# Patient Record
Sex: MALE | Race: WHITE | NOT HISPANIC OR LATINO | Employment: OTHER | URBAN - METROPOLITAN AREA
[De-identification: names, ages, dates, MRNs, and addresses within clinical notes are randomized per-mention and may not be internally consistent; named-entity substitution may affect disease eponyms.]

---

## 2018-07-15 ENCOUNTER — HOSPITAL ENCOUNTER (EMERGENCY)
Facility: HOSPITAL | Age: 83
Discharge: HOME | End: 2018-07-15
Attending: EMERGENCY MEDICINE | Admitting: EMERGENCY MEDICINE

## 2018-07-15 ENCOUNTER — APPOINTMENT (OUTPATIENT)
Dept: GENERAL RADIOLOGY | Facility: HOSPITAL | Age: 83
End: 2018-07-15

## 2018-07-15 VITALS
TEMPERATURE: 98.4 F | HEIGHT: 71 IN | HEART RATE: 60 BPM | DIASTOLIC BLOOD PRESSURE: 67 MMHG | OXYGEN SATURATION: 95 % | RESPIRATION RATE: 18 BRPM | SYSTOLIC BLOOD PRESSURE: 142 MMHG
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
## 2018-07-15 DIAGNOSIS — S20.212A CONTUSION OF LEFT CHEST WALL, INITIAL ENCOUNTER: Primary | ICD-10-CM

## 2018-07-15 PROCEDURE — 71101 X-RAY EXAM UNILAT RIBS/CHEST: CPT

## 2018-07-15 PROCEDURE — 99283 EMERGENCY DEPT VISIT LOW MDM: CPT

## 2018-07-15 RX ORDER — ASPIRIN 81 MG/1
81 TABLET, CHEWABLE ORAL DAILY
COMMUNITY

## 2018-07-15 RX ORDER — METOPROLOL SUCCINATE 25 MG/1
25 TABLET, EXTENDED RELEASE ORAL DAILY
COMMUNITY

## 2018-07-15 RX ORDER — FUROSEMIDE 20 MG/1
20 TABLET ORAL 2 TIMES DAILY PRN
COMMUNITY
End: 2019-05-28 | Stop reason: HOSPADM

## 2018-07-15 RX ORDER — RANITIDINE 150 MG/1
TABLET ORAL
Status: ON HOLD | COMMUNITY
Start: 2017-11-08 | End: 2018-08-02

## 2018-07-15 RX ORDER — SIMVASTATIN 40 MG
40 TABLET ORAL DAILY
COMMUNITY
End: 2019-05-28 | Stop reason: HOSPADM

## 2018-07-15 RX ORDER — ATENOLOL 100 MG/1
TABLET ORAL
Status: ON HOLD | COMMUNITY
Start: 2016-10-10 | End: 2018-08-02

## 2018-07-15 RX ORDER — DIGOXIN 250 MCG
125 TABLET ORAL
COMMUNITY
End: 2019-05-23 | Stop reason: ALTCHOICE

## 2018-07-15 RX ORDER — ARIPIPRAZOLE 5 MG/1
7.5 TABLET ORAL EVERY EVENING
COMMUNITY

## 2018-07-15 RX ORDER — LEVOTHYROXINE SODIUM 0.07 MG/1
75 TABLET ORAL DAILY
COMMUNITY

## 2018-07-15 NOTE — ED PROVIDER NOTES
EMERGENCY DEPARTMENT ENCOUNTER    CHIEF COMPLAINT  Chief Complaint: rib injury  History given by: patient, spouse  History limited by: dementia  Room Number: 29/29  PMD: Adalberto Hudson MD      HPI:  Pt is a 82 y.o. male who presents complaining of left sided rib pain s/p fall around 1020 this morning. Pt's spouse states that the pt lost his balance and struck his anterior chest wall on the ground. Pt's spouse states that the pt did not hit his head or lose consciousness. Pt's family states that the pt is chronically off-balance and has been to able to ambulate since the fall. Pt is on Pradaxa for coronary stents.    Duration:  9 hours  Onset: sudden  Timing: constant  Location: left anterior chest wall  Intensity/Severity: moderate  Progression: unchanged  Associated Symptoms: none  Aggravating Factors: none  Alleviating Factors: none  Previous Episodes: none mentioned  Treatment before arrival: none    PAST MEDICAL HISTORY  Active Ambulatory Problems     Diagnosis Date Noted   • No Active Ambulatory Problems     Resolved Ambulatory Problems     Diagnosis Date Noted   • No Resolved Ambulatory Problems     Past Medical History:   Diagnosis Date   • Parkinson disease (CMS/[facility])        PAST SURGICAL HISTORY  No past surgical history on file.    FAMILY HISTORY  No family history on file.    SOCIAL HISTORY  Social History     Social History   • Marital status:      Spouse name: N/A   • Number of children: N/A   • Years of education: N/A     Occupational History   • Not on file.     Social History Main Topics   • Smoking status: Not on file   • Smokeless tobacco: Not on file   • Alcohol use Not on file   • Drug use: Unknown   • Sexual activity: Not on file     Other Topics Concern   • Not on file     Social History Narrative   • No narrative on file       ALLERGIES  Patient has no known allergies.    REVIEW OF SYSTEMS  Review of Systems   Unable to perform ROS: Dementia   Cardiovascular: Positive for  chest pain (left anterior chest wall).       PHYSICAL EXAM  ED Triage Vitals   Temp Heart Rate Resp BP SpO2   07/15/18 1827 07/15/18 1819 07/15/18 1819 07/15/18 1825 07/15/18 1819   98.4 °F (36.9 °C) 60 18 142/67 95 %      Temp src Heart Rate Source Patient Position BP Location FiO2 (%)   07/15/18 1827 -- -- 07/15/18 1825 --   Oral   Right arm        Physical Exam   Constitutional: No distress.   Pt appears elderly   HENT:   Head: Normocephalic and atraumatic.   Eyes: EOM are normal. Pupils are equal, round, and reactive to light.   Neck: Normal range of motion. Neck supple.   Cardiovascular: Normal rate, regular rhythm and normal heart sounds.    Pulmonary/Chest: Effort normal and breath sounds normal. No respiratory distress. He exhibits tenderness (inferior and lateral to pacemaker).   Pacemaker in left anterior chest wall   Abdominal: Soft. There is no tenderness. There is no rebound and no guarding.   Musculoskeletal: Normal range of motion. He exhibits edema (mild, BLE).   Neurological: He is alert. He has normal sensation and normal strength.   Skin: Skin is warm and dry.   Chronic venous stasis changes to BLE   Nursing note and vitals reviewed.    RADIOLOGY  XR Ribs Left With PA Chest   Preliminary Result   No acute findings.                   I ordered the above noted radiological studies. Interpreted by radiologist. Reviewed by me in PACS.       PROCEDURES  Procedures      PROGRESS AND CONSULTS  ED Course as of Jul 15 1943   Sun Jul 15, 2018   1859 81 y/o male with hx of dementia had a mechanical fall while getting into the car earlier today to go to [place].  He injured the left side of his ribs.  The fall was witnessed by the spouse and is stated to be mechanical in nature.  It is stated that he did not hit his head.  He is taking pradaxa due to CAD/Stents.  There is a hx of parkinsons dz.      PE:  A&O x 2.  TTP Left anteriolateral ribs.  There is no paradoxical movement of chest wall with breathing.   Lungs appear CTA with good movement.  There is no step-off deformity of ribs noted on PE.  His abdomen and LUQ appear NT.  [RC]      ED Course User Index  [RC] Sina Sher III, PA     1924- Notified pt and family that I am waiting on the official reading of his L rib XR results. Pt understands and agrees with the plan, all questions answered.    1938-Rechecked pt. Pt is resting comfortably. Notified pt and family of the pt's negative L rib XR. Discussed the plan to discharge the pt home with an incentive spirometer. I instructed the pt to take Tylenol as needed for pain and RTER warning given for fever, SOA or any concerns. Pt understands and agrees with the plan, all questions answered.    MEDICAL DECISION MAKING  Results were reviewed/discussed with the patient and they were also made aware of online access. Pt also made aware that follow up with PMD is necessary.     MDM  Number of Diagnoses or Management Options  Contusion of left chest wall, initial encounter:   Fall, initial encounter:      Amount and/or Complexity of Data Reviewed  Tests in the radiology section of CPT®: ordered and reviewed (L rib XR shows nothing acute)  Decide to obtain previous medical records or to obtain history from someone other than the patient: yes  Obtain history from someone other than the patient: yes (spouse)  Independent visualization of images, tracings, or specimens: yes    Patient Progress  Patient progress: stable         DIAGNOSIS  Final diagnoses:   Contusion of left chest wall, initial encounter   Fall, initial encounter       DISPOSITION  DISCHARGE    Patient discharged in stable condition.    Reviewed implications of results, diagnosis, meds, responsibility to follow up, warning signs and symptoms of possible worsening, potential complications and reasons to return to ER, including fever, SOA or any concerns.    Patient/Family voiced understanding of above instructions.    Discussed plan for discharge, as  there is no emergent indication for admission. Patient referred to primary care provider for BP management due to today's BP. Pt/family is agreeable and understands need for follow up and repeat testing.  Pt is aware that discharge does not mean that nothing is wrong but it indicates no emergency is present that requires admission and they must continue care with follow-up as given below or physician of their choice.     FOLLOW-UP  Adalberto Hudson MD  [address]  [address]  [place] [address] [phone]    Call   As needed, If symptoms worsen         Medication List      No changes were made to your prescriptions during this visit.           Latest Documented Vital Signs:  As of 7:43 PM  BP- 142/67 HR- 60 Temp- 98.4 °F (36.9 °C) (Oral) O2 sat- 95%    --  Documentation assistance provided by yudi Kaminski for Dr. Castro.  Information recorded by the scribe was done at my direction and has been verified and validated by me.     Monserrat Kaminski  07/15/18 1944       Attila Castro MD  07/16/18 2001

## 2018-07-20 ENCOUNTER — TRANSCRIBE ORDERS (OUTPATIENT)
Dept: ADMINISTRATIVE | Facility: HOSPITAL | Age: 83
End: 2018-07-20

## 2018-07-20 DIAGNOSIS — I25.10 ASCVD (ARTERIOSCLEROTIC CARDIOVASCULAR DISEASE): Primary | ICD-10-CM

## 2018-07-26 ENCOUNTER — TELEPHONE (OUTPATIENT)
Dept: SOCIAL WORK | Facility: HOSPITAL | Age: 83
End: 2018-07-26

## 2018-07-30 ENCOUNTER — TRANSCRIBE ORDERS (OUTPATIENT)
Dept: ADMINISTRATIVE | Facility: HOSPITAL | Age: 83
End: 2018-07-30

## 2018-07-30 DIAGNOSIS — M79.661 PAIN IN BOTH LOWER LEGS: Primary | ICD-10-CM

## 2018-07-30 DIAGNOSIS — M79.662 PAIN IN BOTH LOWER LEGS: Primary | ICD-10-CM

## 2018-07-31 ENCOUNTER — HOSPITAL ENCOUNTER (OUTPATIENT)
Dept: CARDIOLOGY | Facility: HOSPITAL | Age: 83
Discharge: HOME | End: 2018-07-31
Attending: FAMILY MEDICINE | Admitting: FAMILY MEDICINE

## 2018-07-31 ENCOUNTER — HOSPITAL ENCOUNTER (OUTPATIENT)
Dept: CARDIOLOGY | Facility: HOSPITAL | Age: 83
Discharge: HOME | End: 2018-07-31
Attending: FAMILY MEDICINE

## 2018-07-31 VITALS — DIASTOLIC BLOOD PRESSURE: 66 MMHG | WEIGHT: 176 LBS | SYSTOLIC BLOOD PRESSURE: 142 MMHG | BODY MASS INDEX: 24.55 KG/M2

## 2018-07-31 DIAGNOSIS — M79.661 PAIN IN BOTH LOWER LEGS: ICD-10-CM

## 2018-07-31 DIAGNOSIS — I25.10 ASCVD (ARTERIOSCLEROTIC CARDIOVASCULAR DISEASE): ICD-10-CM

## 2018-07-31 DIAGNOSIS — M79.662 PAIN IN BOTH LOWER LEGS: ICD-10-CM

## 2018-07-31 LAB
BH CV ECHO MEAS - ACS: 1.8 CM
BH CV ECHO MEAS - AO MEAN PG (FULL): 1 MMHG
BH CV ECHO MEAS - AO MEAN PG: 3 MMHG
BH CV ECHO MEAS - AO ROOT AREA (BSA CORRECTED): 1.7
BH CV ECHO MEAS - AO ROOT AREA: 9.1 CM^2
BH CV ECHO MEAS - AO ROOT DIAM: 3.4 CM
BH CV ECHO MEAS - AO V2 MAX: 124 CM/SEC
BH CV ECHO MEAS - AO V2 MEAN: 83.1 CM/SEC
BH CV ECHO MEAS - AO V2 VTI: 25.6 CM
BH CV ECHO MEAS - AVA(I,A): 2.6 CM^2
BH CV ECHO MEAS - AVA(I,D): 2.6 CM^2
BH CV ECHO MEAS - BSA(HAYCOCK): 2 M^2
BH CV ECHO MEAS - BSA: 2 M^2
BH CV ECHO MEAS - BZI_BMI: 24.5 KILOGRAMS/M^2
BH CV ECHO MEAS - BZI_METRIC_HEIGHT: 180.3 CM
BH CV ECHO MEAS - BZI_METRIC_WEIGHT: 79.8 KG
BH CV ECHO MEAS - CONTRAST EF (2CH): 61 ML/M^2
BH CV ECHO MEAS - CONTRAST EF 4CH: 58.7 ML/M^2
BH CV ECHO MEAS - EDV(CUBED): 185.2 ML
BH CV ECHO MEAS - EDV(MOD-SP2): 77 ML
BH CV ECHO MEAS - EDV(MOD-SP4): 92 ML
BH CV ECHO MEAS - EDV(TEICH): 160 ML
BH CV ECHO MEAS - EF(CUBED): 72.6 %
BH CV ECHO MEAS - EF(MOD-BP): 61 %
BH CV ECHO MEAS - EF(MOD-SP2): 61 %
BH CV ECHO MEAS - EF(MOD-SP4): 58.7 %
BH CV ECHO MEAS - EF(TEICH): 63.7 %
BH CV ECHO MEAS - ESV(CUBED): 50.7 ML
BH CV ECHO MEAS - ESV(MOD-SP2): 30 ML
BH CV ECHO MEAS - ESV(MOD-SP4): 38 ML
BH CV ECHO MEAS - ESV(TEICH): 58.1 ML
BH CV ECHO MEAS - FS: 35.1 %
BH CV ECHO MEAS - IVS/LVPW: 0.9
BH CV ECHO MEAS - IVSD: 0.9 CM
BH CV ECHO MEAS - LAT PEAK E' VEL: 5 CM/SEC
BH CV ECHO MEAS - LV DIASTOLIC VOL/BSA (35-75): 46.1 ML/M^2
BH CV ECHO MEAS - LV MASS(C)D: 211.7 GRAMS
BH CV ECHO MEAS - LV MASS(C)DI: 106 GRAMS/M^2
BH CV ECHO MEAS - LV MEAN PG: 2 MMHG
BH CV ECHO MEAS - LV SYSTOLIC VOL/BSA (12-30): 19 ML/M^2
BH CV ECHO MEAS - LV V1 MAX: 105 CM/SEC
BH CV ECHO MEAS - LV V1 MEAN: 61.1 CM/SEC
BH CV ECHO MEAS - LV V1 VTI: 21.4 CM
BH CV ECHO MEAS - LVIDD: 5.7 CM
BH CV ECHO MEAS - LVIDS: 3.7 CM
BH CV ECHO MEAS - LVLD AP2: 6.1 CM
BH CV ECHO MEAS - LVLD AP4: 7.8 CM
BH CV ECHO MEAS - LVLS AP2: 6 CM
BH CV ECHO MEAS - LVLS AP4: 6.9 CM
BH CV ECHO MEAS - LVOT AREA (M): 3.1 CM^2
BH CV ECHO MEAS - LVOT AREA: 3.1 CM^2
BH CV ECHO MEAS - LVOT DIAM: 2 CM
BH CV ECHO MEAS - LVPWD: 1 CM
BH CV ECHO MEAS - MED PEAK E' VEL: 7 CM/SEC
BH CV ECHO MEAS - MR MAX PG: 58.5 MMHG
BH CV ECHO MEAS - MR MAX VEL: 376.5 CM/SEC
BH CV ECHO MEAS - MV DEC SLOPE: 642 CM/SEC^2
BH CV ECHO MEAS - MV DEC TIME: 0.2 SEC
BH CV ECHO MEAS - MV E MAX VEL: 150 CM/SEC
BH CV ECHO MEAS - MV MEAN PG: 3 MMHG
BH CV ECHO MEAS - MV P1/2T MAX VEL: 168 CM/SEC
BH CV ECHO MEAS - MV P1/2T: 76.6 MSEC
BH CV ECHO MEAS - MV V2 MEAN: 68 CM/SEC
BH CV ECHO MEAS - MV V2 VTI: 43.1 CM
BH CV ECHO MEAS - MVA P1/2T LCG: 1.3 CM^2
BH CV ECHO MEAS - MVA(P1/2T): 2.9 CM^2
BH CV ECHO MEAS - MVA(VTI): 1.6 CM^2
BH CV ECHO MEAS - PA ACC SLOPE: 35.5 CM/SEC^2
BH CV ECHO MEAS - PA ACC TIME: 0.11 SEC
BH CV ECHO MEAS - PA MAX PG: 3.6 MMHG
BH CV ECHO MEAS - PA PR(ACCEL): 30 MMHG
BH CV ECHO MEAS - PA V2 MAX: 94.6 CM/SEC
BH CV ECHO MEAS - QP/QS: 1.2
BH CV ECHO MEAS - RAP SYSTOLE: 8 MMHG
BH CV ECHO MEAS - RV MEAN PG: 1 MMHG
BH CV ECHO MEAS - RV V1 MEAN: 47.7 CM/SEC
BH CV ECHO MEAS - RV V1 VTI: 17.3 CM
BH CV ECHO MEAS - RVOT AREA: 4.5 CM^2
BH CV ECHO MEAS - RVOT DIAM: 2.4 CM
BH CV ECHO MEAS - RVSP: 37.8 MMHG
BH CV ECHO MEAS - SI(AO): 116.4 ML/M^2
BH CV ECHO MEAS - SI(CUBED): 67.4 ML/M^2
BH CV ECHO MEAS - SI(LVOT): 33.7 ML/M^2
BH CV ECHO MEAS - SI(MOD-SP2): 23.5 ML/M^2
BH CV ECHO MEAS - SI(MOD-SP4): 27 ML/M^2
BH CV ECHO MEAS - SI(TEICH): 51 ML/M^2
BH CV ECHO MEAS - SV(AO): 232.4 ML
BH CV ECHO MEAS - SV(CUBED): 134.5 ML
BH CV ECHO MEAS - SV(LVOT): 67.2 ML
BH CV ECHO MEAS - SV(MOD-SP2): 47 ML
BH CV ECHO MEAS - SV(MOD-SP4): 54 ML
BH CV ECHO MEAS - SV(RVOT): 78.3 ML
BH CV ECHO MEAS - SV(TEICH): 101.9 ML
BH CV ECHO MEAS - TAPSE (>1.6): 1.8 CM2
BH CV ECHO MEAS - TR MAX VEL: 273 CM/SEC
BH CV ECHO MEASUREMENTS AVERAGE E/E' RATIO: 25
BH CV LOW VAS LEFT LESSER SAPH VESSEL: 1
BH CV LOW VAS RIGHT LESSER SAPH VESSEL: 1
BH CV LOWER VASCULAR LEFT COMMON FEMORAL AUGMENT: NORMAL
BH CV LOWER VASCULAR LEFT COMMON FEMORAL COMPETENT: NORMAL
BH CV LOWER VASCULAR LEFT COMMON FEMORAL COMPRESS: NORMAL
BH CV LOWER VASCULAR LEFT COMMON FEMORAL PHASIC: NORMAL
BH CV LOWER VASCULAR LEFT COMMON FEMORAL SPONT: NORMAL
BH CV LOWER VASCULAR LEFT DISTAL FEMORAL COMPRESS: NORMAL
BH CV LOWER VASCULAR LEFT GASTRONEMIUS COMPRESS: NORMAL
BH CV LOWER VASCULAR LEFT GREATER SAPH AK COMPRESS: NORMAL
BH CV LOWER VASCULAR LEFT GREATER SAPH BK COMPRESS: NORMAL
BH CV LOWER VASCULAR LEFT LESSER SAPH COMPRESS: NORMAL
BH CV LOWER VASCULAR LEFT LESSER SAPH THROMBUS: NORMAL
BH CV LOWER VASCULAR LEFT MID FEMORAL AUGMENT: NORMAL
BH CV LOWER VASCULAR LEFT MID FEMORAL COMPETENT: NORMAL
BH CV LOWER VASCULAR LEFT MID FEMORAL COMPRESS: NORMAL
BH CV LOWER VASCULAR LEFT MID FEMORAL PHASIC: NORMAL
BH CV LOWER VASCULAR LEFT MID FEMORAL SPONT: NORMAL
BH CV LOWER VASCULAR LEFT PERONEAL COMPRESS: NORMAL
BH CV LOWER VASCULAR LEFT POPLITEAL AUGMENT: NORMAL
BH CV LOWER VASCULAR LEFT POPLITEAL COMPETENT: NORMAL
BH CV LOWER VASCULAR LEFT POPLITEAL COMPRESS: NORMAL
BH CV LOWER VASCULAR LEFT POPLITEAL PHASIC: NORMAL
BH CV LOWER VASCULAR LEFT POPLITEAL SPONT: NORMAL
BH CV LOWER VASCULAR LEFT POSTERIOR TIBIAL COMPRESS: NORMAL
BH CV LOWER VASCULAR LEFT PROXIMAL FEMORAL COMPRESS: NORMAL
BH CV LOWER VASCULAR LEFT SAPHENOFEMORAL JUNCTION AUGMENT: NORMAL
BH CV LOWER VASCULAR LEFT SAPHENOFEMORAL JUNCTION COMPETENT: NORMAL
BH CV LOWER VASCULAR LEFT SAPHENOFEMORAL JUNCTION COMPRESS: NORMAL
BH CV LOWER VASCULAR LEFT SAPHENOFEMORAL JUNCTION PHASIC: NORMAL
BH CV LOWER VASCULAR LEFT SAPHENOFEMORAL JUNCTION SPONT: NORMAL
BH CV LOWER VASCULAR RIGHT COMMON FEMORAL AUGMENT: NORMAL
BH CV LOWER VASCULAR RIGHT COMMON FEMORAL COMPETENT: NORMAL
BH CV LOWER VASCULAR RIGHT COMMON FEMORAL COMPRESS: NORMAL
BH CV LOWER VASCULAR RIGHT COMMON FEMORAL PHASIC: NORMAL
BH CV LOWER VASCULAR RIGHT COMMON FEMORAL SPONT: NORMAL
BH CV LOWER VASCULAR RIGHT DISTAL FEMORAL COMPRESS: NORMAL
BH CV LOWER VASCULAR RIGHT GASTRONEMIUS COMPRESS: NORMAL
BH CV LOWER VASCULAR RIGHT GREATER SAPH AK COMPRESS: NORMAL
BH CV LOWER VASCULAR RIGHT GREATER SAPH BK COMPRESS: NORMAL
BH CV LOWER VASCULAR RIGHT LESSER SAPH COMPRESS: NORMAL
BH CV LOWER VASCULAR RIGHT LESSER SAPH THROMBUS: NORMAL
BH CV LOWER VASCULAR RIGHT MID FEMORAL AUGMENT: NORMAL
BH CV LOWER VASCULAR RIGHT MID FEMORAL COMPETENT: NORMAL
BH CV LOWER VASCULAR RIGHT MID FEMORAL COMPRESS: NORMAL
BH CV LOWER VASCULAR RIGHT MID FEMORAL PHASIC: NORMAL
BH CV LOWER VASCULAR RIGHT MID FEMORAL SPONT: NORMAL
BH CV LOWER VASCULAR RIGHT PERONEAL COMPRESS: NORMAL
BH CV LOWER VASCULAR RIGHT POPLITEAL AUGMENT: NORMAL
BH CV LOWER VASCULAR RIGHT POPLITEAL COMPETENT: NORMAL
BH CV LOWER VASCULAR RIGHT POPLITEAL COMPRESS: NORMAL
BH CV LOWER VASCULAR RIGHT POPLITEAL PHASIC: NORMAL
BH CV LOWER VASCULAR RIGHT POPLITEAL SPONT: NORMAL
BH CV LOWER VASCULAR RIGHT POSTERIOR TIBIAL COMPRESS: NORMAL
BH CV LOWER VASCULAR RIGHT PROXIMAL FEMORAL COMPRESS: NORMAL
BH CV LOWER VASCULAR RIGHT SAPHENOFEMORAL JUNCTION AUGMENT: NORMAL
BH CV LOWER VASCULAR RIGHT SAPHENOFEMORAL JUNCTION COMPETENT: NORMAL
BH CV LOWER VASCULAR RIGHT SAPHENOFEMORAL JUNCTION COMPRESS: NORMAL
BH CV LOWER VASCULAR RIGHT SAPHENOFEMORAL JUNCTION PHASIC: NORMAL
BH CV LOWER VASCULAR RIGHT SAPHENOFEMORAL JUNCTION SPONT: NORMAL
BH CV VAS BP RIGHT ARM: NORMAL MMHG
BH CV XLRA - RV BASE: 3.9 CM
BH CV XLRA - TDI S': 12 CM/SEC
LEFT ATRIUM VOLUME INDEX: 46 ML/M2
MAXIMAL PREDICTED HEART RATE: 140 BPM
STRESS TARGET HR: 119 BPM

## 2018-07-31 PROCEDURE — 93970 EXTREMITY STUDY: CPT

## 2018-07-31 PROCEDURE — 93306 TTE W/DOPPLER COMPLETE: CPT

## 2018-07-31 PROCEDURE — 93306 TTE W/DOPPLER COMPLETE: CPT | Performed by: INTERNAL MEDICINE

## 2018-08-02 ENCOUNTER — HOSPITAL ENCOUNTER (INPATIENT)
Facility: HOSPITAL | Age: 83
LOS: 2 days | Discharge: HOME | End: 2018-08-06
Attending: FAMILY MEDICINE | Admitting: FAMILY MEDICINE

## 2018-08-02 PROBLEM — L03.119 CELLULITIS AND ABSCESS OF LEG, EXCEPT FOOT: Status: ACTIVE | Noted: 2018-08-02

## 2018-08-02 PROBLEM — L02.419 CELLULITIS AND ABSCESS OF LEG, EXCEPT FOOT: Status: ACTIVE | Noted: 2018-08-02

## 2018-08-02 LAB
ANION GAP SERPL CALCULATED.3IONS-SCNC: 10.6 MMOL/L
BUN BLD-MCNC: 19 MG/DL (ref 8–23)
BUN/CREAT SERPL: 17.9 (ref 7–25)
CALCIUM SPEC-SCNC: 9.6 MG/DL (ref 8.8–10.5)
CHLORIDE SERPL-SCNC: 97 MMOL/L (ref 98–107)
CO2 SERPL-SCNC: 31.4 MMOL/L (ref 22–29)
CREAT BLD-MCNC: 1.06 MG/DL (ref 0.76–1.27)
DEPRECATED RDW RBC AUTO: 48.9 FL (ref 37–54)
ERYTHROCYTE [DISTWIDTH] IN BLOOD BY AUTOMATED COUNT: 14.6 % (ref 11.5–14.5)
GFR SERPL CREATININE-BSD FRML MDRD: 67 ML/MIN/1.73
GLUCOSE BLD-MCNC: 213 MG/DL (ref 65–99)
HCT VFR BLD AUTO: 39 % (ref 42–52)
HGB BLD-MCNC: 13.2 G/DL (ref 14–18)
MCH RBC QN AUTO: 31.2 PG (ref 27–31)
MCHC RBC AUTO-ENTMCNC: 33.8 G/DL (ref 31–37)
MCV RBC AUTO: 92.2 FL (ref 80–94)
NT-PROBNP SERPL-MCNC: 648 PG/ML (ref 5–450)
PLATELET # BLD AUTO: 133 10*3/MM3 (ref 140–500)
PMV BLD AUTO: 10.2 FL (ref 7.4–10.4)
POTASSIUM BLD-SCNC: 4.1 MMOL/L (ref 3.5–5.2)
RBC # BLD AUTO: 4.23 10*6/MM3 (ref 4.7–6.1)
SODIUM BLD-SCNC: 139 MMOL/L (ref 136–145)
WBC NRBC COR # BLD: 6.85 10*3/MM3 (ref 4.8–10.8)

## 2018-08-02 PROCEDURE — 85027 COMPLETE CBC AUTOMATED: CPT | Performed by: FAMILY MEDICINE

## 2018-08-02 PROCEDURE — 87075 CULTR BACTERIA EXCEPT BLOOD: CPT | Performed by: FAMILY MEDICINE

## 2018-08-02 PROCEDURE — 87070 CULTURE OTHR SPECIMN AEROBIC: CPT | Performed by: FAMILY MEDICINE

## 2018-08-02 PROCEDURE — G0378 HOSPITAL OBSERVATION PER HR: HCPCS

## 2018-08-02 PROCEDURE — 83880 ASSAY OF NATRIURETIC PEPTIDE: CPT | Performed by: FAMILY MEDICINE

## 2018-08-02 PROCEDURE — 80048 BASIC METABOLIC PNL TOTAL CA: CPT | Performed by: FAMILY MEDICINE

## 2018-08-02 PROCEDURE — 25010000002 CEFTRIAXONE PER 250 MG: Performed by: FAMILY MEDICINE

## 2018-08-02 PROCEDURE — 25010000002 FUROSEMIDE PER 20 MG: Performed by: FAMILY MEDICINE

## 2018-08-02 PROCEDURE — 87205 SMEAR GRAM STAIN: CPT | Performed by: FAMILY MEDICINE

## 2018-08-02 RX ORDER — DABIGATRAN ETEXILATE 150 MG/1
150 CAPSULE ORAL EVERY 12 HOURS SCHEDULED
Status: DISCONTINUED | OUTPATIENT
Start: 2018-08-02 | End: 2018-08-06 | Stop reason: HOSPADM

## 2018-08-02 RX ORDER — ASPIRIN 81 MG/1
81 TABLET, CHEWABLE ORAL DAILY
Status: DISCONTINUED | OUTPATIENT
Start: 2018-08-02 | End: 2018-08-06 | Stop reason: HOSPADM

## 2018-08-02 RX ORDER — FAMOTIDINE 20 MG/1
20 TABLET, FILM COATED ORAL 2 TIMES DAILY
Status: DISCONTINUED | OUTPATIENT
Start: 2018-08-02 | End: 2018-08-06 | Stop reason: HOSPADM

## 2018-08-02 RX ORDER — MULTIPLE VITAMINS W/ MINERALS TAB 9MG-400MCG
1 TAB ORAL DAILY
Status: DISCONTINUED | OUTPATIENT
Start: 2018-08-03 | End: 2018-08-06 | Stop reason: HOSPADM

## 2018-08-02 RX ORDER — DIGOXIN 250 MCG
250 TABLET ORAL DAILY
Status: DISCONTINUED | OUTPATIENT
Start: 2018-08-02 | End: 2018-08-06 | Stop reason: HOSPADM

## 2018-08-02 RX ORDER — MULTIPLE VITAMINS W/ MINERALS TAB 9MG-400MCG
1 TAB ORAL DAILY
COMMUNITY

## 2018-08-02 RX ORDER — RANITIDINE 150 MG/1
150 TABLET ORAL 2 TIMES DAILY
COMMUNITY
End: 2019-05-28 | Stop reason: HOSPADM

## 2018-08-02 RX ORDER — SODIUM CHLORIDE 0.9 % (FLUSH) 0.9 %
1-10 SYRINGE (ML) INJECTION AS NEEDED
Status: DISCONTINUED | OUTPATIENT
Start: 2018-08-02 | End: 2018-08-06 | Stop reason: HOSPADM

## 2018-08-02 RX ORDER — METOPROLOL SUCCINATE 25 MG/1
25 TABLET, EXTENDED RELEASE ORAL DAILY
Status: DISCONTINUED | OUTPATIENT
Start: 2018-08-03 | End: 2018-08-06 | Stop reason: HOSPADM

## 2018-08-02 RX ORDER — ACETAMINOPHEN 325 MG/1
650 TABLET ORAL EVERY 4 HOURS PRN
Status: DISCONTINUED | OUTPATIENT
Start: 2018-08-02 | End: 2018-08-06 | Stop reason: HOSPADM

## 2018-08-02 RX ORDER — ONDANSETRON 4 MG/1
4 TABLET, FILM COATED ORAL EVERY 6 HOURS PRN
Status: DISCONTINUED | OUTPATIENT
Start: 2018-08-02 | End: 2018-08-06 | Stop reason: HOSPADM

## 2018-08-02 RX ORDER — BISACODYL 5 MG/1
5 TABLET, DELAYED RELEASE ORAL DAILY PRN
Status: DISCONTINUED | OUTPATIENT
Start: 2018-08-02 | End: 2018-08-06 | Stop reason: HOSPADM

## 2018-08-02 RX ORDER — ARIPIPRAZOLE 5 MG/1
5 TABLET ORAL EVERY EVENING
Status: DISCONTINUED | OUTPATIENT
Start: 2018-08-02 | End: 2018-08-04

## 2018-08-02 RX ORDER — SODIUM CHLORIDE 9 MG/ML
40 INJECTION, SOLUTION INTRAVENOUS AS NEEDED
Status: DISCONTINUED | OUTPATIENT
Start: 2018-08-02 | End: 2018-08-06 | Stop reason: HOSPADM

## 2018-08-02 RX ORDER — LEVOTHYROXINE SODIUM 0.07 MG/1
75 TABLET ORAL DAILY
Status: DISCONTINUED | OUTPATIENT
Start: 2018-08-03 | End: 2018-08-06 | Stop reason: HOSPADM

## 2018-08-02 RX ORDER — FUROSEMIDE 10 MG/ML
40 INJECTION INTRAMUSCULAR; INTRAVENOUS ONCE
Status: COMPLETED | OUTPATIENT
Start: 2018-08-02 | End: 2018-08-02

## 2018-08-02 RX ORDER — DABIGATRAN ETEXILATE 150 MG/1
150 CAPSULE ORAL 2 TIMES DAILY
COMMUNITY
End: 2019-05-28 | Stop reason: HOSPADM

## 2018-08-02 RX ADMIN — DABIGATRAN ETEXILATE MESYLATE 150 MG: 75 CAPSULE ORAL at 20:03

## 2018-08-02 RX ADMIN — FAMOTIDINE 20 MG: 20 TABLET ORAL at 20:03

## 2018-08-02 RX ADMIN — CEFTRIAXONE 1 G: 1 INJECTION, SOLUTION INTRAVENOUS at 17:16

## 2018-08-02 RX ADMIN — ARIPIPRAZOLE 5 MG: 5 TABLET ORAL at 19:20

## 2018-08-02 RX ADMIN — FUROSEMIDE 40 MG: 10 INJECTION, SOLUTION INTRAMUSCULAR; INTRAVENOUS at 17:17

## 2018-08-02 NOTE — PLAN OF CARE
Problem: Patient Care Overview  Goal: Plan of Care Review  Outcome: Ongoing (interventions implemented as appropriate)   08/02/18 1954   Coping/Psychosocial   Plan of Care Reviewed With patient;spouse   Plan of Care Review   Progress improving   OTHER   Outcome Summary patient direct admit for right lower extremity cellulitis. Started on IV rocephine daily & IV lasix, patient pleasantly confused, wife leaving & will be back tomorrow morrning       Problem: Skin and Soft Tissue Infection (Adult)  Goal: Signs and Symptoms of Listed Potential Problems Will be Absent, Minimized or Managed (Skin and Soft Tissue Infection)  Outcome: Ongoing (interventions implemented as appropriate)   08/02/18 1954   Goal/Outcome Evaluation   Problems Assessed (Skin and Soft Tissue Infection) all   Problems Present (Skin/Soft Tissue Inf) none       Problem: Fall Risk (Adult)  Goal: Identify Related Risk Factors and Signs and Symptoms  Outcome: Ongoing (interventions implemented as appropriate)   08/02/18 1954   Fall Risk (Adult)   Related Risk Factors (Fall Risk) age-related changes;environment unfamiliar   Signs and Symptoms (Fall Risk) presence of risk factors       Problem: Skin Injury Risk (Adult)  Goal: Identify Related Risk Factors and Signs and Symptoms  Outcome: Ongoing (interventions implemented as appropriate)   08/02/18 1954   Skin Injury Risk (Adult)   Related Risk Factors (Skin Injury Risk) advanced age;infection;tissue perfusion altered

## 2018-08-02 NOTE — H&P
HISTORY AND PHYSICAL      Patient Care Team:  Adalberto Hudson MD as PCP - General  Adalberto Hudson MD as PCP - Family Medicine    CHIEF COMPLAINT: Leg redness, swelling, weeping ulcers    HISTORY OF PRESENT ILLNESS:    82-year-old white male with chronic edema with several episodes of prior cellulitis of his right leg who developed some blisters about 5 days ago.  He also suffers from developed some redness and his wife states that he is getting increasing blisters and weeping with some ulcers.  Subsequently the right leg became increasingly red and painful.  Start oral Keflex without any improvement.      Past Medical History:   Diagnosis Date   • CHF (congestive heart failure) (CMS/[facility])    • Disease of thyroid gland    • Elevated cholesterol    • GERD (gastroesophageal reflux disease)    • Hypertension    • Parkinson disease (CMS/[facility])    Sick sinus syndrome  Paroxysmal atrial fibrillation  Alzheimer's dementia  Major depression with atypical features  Past Surgical History:   Procedure Laterality Date   • HERNIA REPAIR     • PACEMAKER IMPLANTATION     Right orchiectomy  No family history on file.  Social History   Substance Use Topics   • Smoking status: Not on file   • Smokeless tobacco: Not on file   • Alcohol use Not on file     Prescriptions Prior to Admission   Medication Sig Dispense Refill Last Dose   • ARIPiprazole (ABILIFY) 5 MG tablet Take 5 mg by mouth Every Evening.   8/1/2018 at 2100   • aspirin 81 MG chewable tablet Chew 81 mg Daily.   8/2/2018 at 0800   • dabigatran etexilate (PRADAXA) 150 MG capsu Take 150 mg by mouth 2 (Two) Times a Day.   8/2/2018 at 0800   • digoxin (LANOXIN) 250 MCG tablet Take 250 mcg by mouth.   8/2/2018 at 0800   • furosemide (LASIX) 20 MG tablet Take 20 mg by mouth 2 (Two) Times a Day.   8/2/2018 at 0800   • levothyroxine (SYNTHROID, LEVOTHROID) 75 MCG tablet Take 75 mcg by mouth Daily.   8/2/2018 at 0800   • metoprolol succinate XL (TOPROL-XL) 25  "MG 24 hr tablet Take 25 mg by mouth Daily.   8/2/2018 at 0800   • Multiple Vitamins-Minerals (MULTIVITAMIN WITH MINERALS) tablet tablet Take 1 tablet by mouth Daily.   8/2/2018 at 0800   • raNITIdine (ZANTAC) 150 MG tablet Take 150 mg by mouth 2 (Two) Times a Day.   8/2/2018 at 0800   • simvastatin (ZOCOR) 40 MG tablet Take 40 mg by mouth Every Night.   8/1/2018 at 2100     Allergies:  Patient has no known allergies.     Review of Systems   Constitutional: Negative for activity change, appetite change and fatigue.   HENT: Negative for congestion.    Respiratory: Negative for cough, chest tightness, shortness of breath and wheezing.    Cardiovascular: Positive for leg swelling. Negative for chest pain.   Gastrointestinal: Negative for abdominal distention, abdominal pain, diarrhea, nausea and vomiting.   Endocrine: Negative for polyphagia and polyuria.   Genitourinary: Negative for frequency.   Skin: Positive for color change and rash.   Neurological: Negative for light-headedness.   Hematological: Does not bruise/bleed easily.   Psychiatric/Behavioral: Negative for agitation and behavioral problems.       Vital Signs  Temp:  [99 °F (37.2 °C)] 99 °F (37.2 °C)  Heart Rate:  [69] 69  Resp:  [18] 18  BP: (146)/(59) 146/59    Flowsheet Rows      First Filed Value   Admission Height  180.3 cm (71\") Documented at 08/02/2018 1524   Admission Weight  77.6 kg (171 lb 1.6 oz) Documented at 08/02/2018 1524             Physical Exam   Constitutional: He appears well-developed and well-nourished.   HENT:   Head: Normocephalic.   Mouth/Throat: Oropharynx is clear and moist.   Eyes: Conjunctivae are normal.   Neck: Normal range of motion. No JVD present. No thyromegaly present.   Cardiovascular: Normal rate and regular rhythm.    Murmur heard.   Systolic murmur is present with a grade of 2/6   Pulmonary/Chest: Effort normal and breath sounds normal. No respiratory distress. He has no wheezes. He has no rales.   Abdominal: Soft. " Bowel sounds are normal. He exhibits no distension. There is no tenderness. There is no guarding.   Neurological: He is alert.   Skin: Skin is warm and dry. No rash noted.        From mid anterior leg to ankle the right leg is red mildly warm to touch and mildly tender.  His several superficial blisters that are draining clear liquid   Nursing note and vitals reviewed.     Results Review:    I reviewed the patient's new clinical results.  Lab Results (most recent)     Procedure Component Value Units Date/Time    CBC (No Diff) [[id number]]  (Abnormal) Collected:  08/02/18 1552    Specimen:  Blood Updated:  08/02/18 1559     WBC 6.85 10*3/mm3      RBC 4.23 (L) 10*6/mm3      Hemoglobin 13.2 (L) g/dL      Hematocrit 39.0 (L) %      MCV 92.2 fL      MCH 31.2 (H) pg      MCHC 33.8 g/dL      RDW 14.6 (H) %      RDW-SD 48.9 fl      MPV 10.2 fL      Platelets 133 (L) 10*3/mm3     BNP [[id number]] Collected:  08/02/18 1552    Specimen:  Blood Updated:  08/02/18 1555    Basic Metabolic Panel [[id number]] Collected:  08/02/18 1552    Specimen:  Blood Updated:  08/02/18 1555          Imaging Results (most recent)     None        reviewed    ECG/EMG Results (most recent)     None        reviewed    Assessment/Plan     1.  Right leg cellulitis with superficial ulcers unresponsive to outpatient therapy patient be admitted and started on IV antibiotics once we cultured    2.  Paroxysmal atrial fibrillation in sinus rhythm will continue Pradaxa  rate is controlled    3.  Hypertension well controlled nothing acute home medications we continued    4.  GERD nothing acute home H2 blocker will be continued    5.  Moderate Alzheimer's dementia stable    6.  Depression anxiety Abilify will be continued    I discussed the patients findings and my recommendations with patient and wife    Aadlberto Hudson MD  08/02/18  4:13 PM

## 2018-08-03 LAB
GLUCOSE BLDC GLUCOMTR-MCNC: 120 MG/DL (ref 70–130)
GLUCOSE BLDC GLUCOMTR-MCNC: 126 MG/DL (ref 70–130)

## 2018-08-03 PROCEDURE — 94799 UNLISTED PULMONARY SVC/PX: CPT

## 2018-08-03 PROCEDURE — 25010000002 CEFTRIAXONE PER 250 MG: Performed by: FAMILY MEDICINE

## 2018-08-03 PROCEDURE — G0378 HOSPITAL OBSERVATION PER HR: HCPCS

## 2018-08-03 PROCEDURE — 82962 GLUCOSE BLOOD TEST: CPT

## 2018-08-03 RX ADMIN — FAMOTIDINE 20 MG: 20 TABLET ORAL at 20:15

## 2018-08-03 RX ADMIN — LEVOTHYROXINE SODIUM 75 MCG: 75 TABLET ORAL at 08:57

## 2018-08-03 RX ADMIN — Medication 1 TABLET: at 08:57

## 2018-08-03 RX ADMIN — METOPROLOL SUCCINATE 25 MG: 25 TABLET, EXTENDED RELEASE ORAL at 08:57

## 2018-08-03 RX ADMIN — FAMOTIDINE 20 MG: 20 TABLET ORAL at 08:57

## 2018-08-03 RX ADMIN — DABIGATRAN ETEXILATE MESYLATE 150 MG: 75 CAPSULE ORAL at 08:57

## 2018-08-03 RX ADMIN — CEFTRIAXONE 1 G: 1 INJECTION, SOLUTION INTRAVENOUS at 16:01

## 2018-08-03 RX ADMIN — DABIGATRAN ETEXILATE MESYLATE 150 MG: 75 CAPSULE ORAL at 20:15

## 2018-08-03 RX ADMIN — DIGOXIN 250 MCG: 250 TABLET ORAL at 08:57

## 2018-08-03 RX ADMIN — ARIPIPRAZOLE 5 MG: 5 TABLET ORAL at 18:33

## 2018-08-03 RX ADMIN — ASPIRIN 81 MG 81 MG: 81 TABLET ORAL at 08:57

## 2018-08-03 NOTE — NURSING NOTE
Discharge Planning Assessment  [facility]     Patient Name: Akshat Jimenez  MRN: [id number]  Today's Date: 8/3/2018    Admit Date: 8/2/2018          Discharge Needs Assessment     Row Name 08/03/18 1242       Living Environment    Lives With spouse    Name(s) of Who Lives With Patient Ruby, spouse.    Current Living Arrangements home/apartment/condo    Primary Care Provided by self    Provides Primary Care For no one    Family Caregiver if Needed spouse    Quality of Family Relationships supportive    Able to Return to Prior Arrangements yes       Resource/Environmental Concerns    Resource/Environmental Concerns none    Transportation Concerns car, none       Transition Planning    Patient/Family Anticipates Transition to home with family    Transportation Anticipated family or friend will provide       Discharge Needs Assessment    Readmission Within the Last 30 Days no previous admission in last 30 days    Concerns to be Addressed denies needs/concerns at this time    Equipment Currently Used at Home power chair,(recliner lift)            Discharge Plan     Row Name 08/03/18 1242       Plan    Plan Plan is home.    Patient/Family in Agreement with Plan yes    Plan Comments Patient in agreement with speaking to  with wife at bedside.  He lives in a multi level  home with wife.  He is able to stay on one level.  He has a step daughter and daughter that live close.   He has been independent with ADLs and with mobility.  He has a lift chair.  He has no home health, other medical equipment, home oxygen, nebulizer or CPAP/BIPAP.  He fills scripts at [facility] or through mail order and has no issues getting or paying for his medicines.  Verfied home address and phone number.  He has a Living Will;  asked family to bring in a copy for the medical record. Patient says the plan is home when medically stable.   will continue to follow.         Destination     No service coordination in  this encounter.      Durable Medical Equipment     No service coordination in this encounter.      Dialysis/Infusion     No service coordination in this encounter.      Home Medical Care     No service coordination in this encounter.      Social Care     No service coordination in this encounter.                Demographic Summary     Row Name 08/03/18 1241       General Information    Admission Type observation    Arrived From home    Referral Source admission list    Reason for Consult discharge planning    Preferred Language English     Used During This Interaction no       Contact Information    Permission Granted to Share Info With             Functional Status    No documentation.           Psychosocial    No documentation.           Abuse/Neglect    No documentation.           Legal    No documentation.           Substance Abuse    No documentation.           Patient Forms    No documentation.         Lovely Macedo RN

## 2018-08-03 NOTE — PLAN OF CARE
Problem: Patient Care Overview  Goal: Plan of Care Review  Outcome: Ongoing (interventions implemented as appropriate)   08/03/18 0412   Coping/Psychosocial   Plan of Care Reviewed With patient   Plan of Care Review   Progress improving   OTHER   Outcome Summary Pt VSS. Pt confused. Bed alarm on, need frequent reminders of location and reason for hospital visit. Will continue to monitor.     Goal: Individualization and Mutuality  Outcome: Ongoing (interventions implemented as appropriate)    Goal: Discharge Needs Assessment  Outcome: Ongoing (interventions implemented as appropriate)    Goal: Interprofessional Rounds/Family Conf  Outcome: Ongoing (interventions implemented as appropriate)      Problem: Skin and Soft Tissue Infection (Adult)  Goal: Signs and Symptoms of Listed Potential Problems Will be Absent, Minimized or Managed (Skin and Soft Tissue Infection)  Outcome: Ongoing (interventions implemented as appropriate)      Problem: Fall Risk (Adult)  Goal: Identify Related Risk Factors and Signs and Symptoms  Outcome: Ongoing (interventions implemented as appropriate)    Goal: Absence of Fall  Outcome: Ongoing (interventions implemented as appropriate)      Problem: Skin Injury Risk (Adult)  Goal: Identify Related Risk Factors and Signs and Symptoms  Outcome: Ongoing (interventions implemented as appropriate)    Goal: Skin Health and Integrity  Outcome: Ongoing (interventions implemented as appropriate)

## 2018-08-03 NOTE — PROGRESS NOTES
"Daily Progress Note:      Chief complaint: cellulitis, hypertension, GERD    Subjective: No complaints    Flowsheet Rows      First Filed Value   Admission Height  180.3 cm (71\") Documented at 08/02/2018 1524   Admission Weight  77.6 kg (171 lb 1.6 oz) Documented at 08/02/2018 1524          Documented weights    08/02/18 1524 08/03/18 0641   Weight: 77.6 kg (171 lb 1.6 oz) 77.1 kg (170 lb)           Patient Vitals for the past 24 hrs:   BP Temp Temp src Pulse Resp SpO2 Height Weight   08/03/18 0641 - - - - - - - 77.1 kg (170 lb)   08/03/18 0638 115/56 97.8 °F (36.6 °C) Oral 55 16 94 % - -   08/02/18 2323 119/58 97.8 °F (36.6 °C) Oral 55 16 93 % - -   08/02/18 1925 159/69 98 °F (36.7 °C) Oral 61 18 94 % - -   08/02/18 1524 146/59 99 °F (37.2 °C) Oral 69 18 95 % 180.3 cm (71\") 77.6 kg (171 lb 1.6 oz)       77.1 kg (170 lb)      Intake/Output Summary (Last 24 hours) at 08/03/18 0745  Last data filed at 08/03/18 0000   Gross per 24 hour   Intake              290 ml   Output              900 ml   Net             -610 ml       Review of Systems   Constitutional: Negative for activity change, appetite change and fatigue.   HENT: Negative for congestion.    Respiratory: Negative for cough, chest tightness, shortness of breath and wheezing.    Cardiovascular: Negative for chest pain.   Gastrointestinal: Negative for abdominal distention, abdominal pain, diarrhea, nausea and vomiting.   Endocrine: Negative for polyphagia and polyuria.   Genitourinary: Negative for frequency.   Skin: Positive for color change and wound. Negative for rash.   Neurological: Negative for light-headedness.   Hematological: Does not bruise/bleed easily.   Psychiatric/Behavioral: Negative for agitation and behavioral problems.       Physical Exam   Constitutional: He appears well-developed and well-nourished.   HENT:   Head: Normocephalic.   Mouth/Throat: Oropharynx is clear and moist.   Eyes: Conjunctivae are normal.   Neck: Normal range of motion. " No JVD present. No thyromegaly present.   Cardiovascular: Normal rate and regular rhythm.    Murmur heard.   Systolic murmur is present with a grade of 2/6   Pulmonary/Chest: Effort normal and breath sounds normal. No respiratory distress. He has no wheezes. He has no rales.   Abdominal: Soft. Bowel sounds are normal. He exhibits no distension. There is no tenderness. There is no guarding.   Neurological: He is alert.   Skin: Skin is warm and dry. No rash noted.   Pain is improved redness is pretty much resolved blisters are more dry and not weeping   Nursing note and vitals reviewed.          Medication Review:     Medication Review:   I have reviewed the patient's current medication list    ARIPiprazole 5 mg Oral Q PM   aspirin 81 mg Oral Daily   ceftriaxone 1 g Intravenous Q24H   dabigatran etexilate 150 mg Oral Q12H   digoxin 250 mcg Oral Daily   famotidine 20 mg Oral BID   levothyroxine 75 mcg Oral Daily   metoprolol succinate XL 25 mg Oral Daily   multivitamin with minerals 1 tablet Oral Daily         Labs:    Results from last 7 days  Lab Units 08/02/18  1552   WBC 10*3/mm3 6.85   HEMOGLOBIN g/dL 13.2*   HEMATOCRIT % 39.0*   PLATELETS 10*3/mm3 133*       Results from last 7 days  Lab Units 08/02/18  1552   SODIUM mmol/L 139   POTASSIUM mmol/L 4.1   CHLORIDE mmol/L 97*   CO2 mmol/L 31.4*   BUN mg/dL 19   CREATININE mg/dL 1.06   CALCIUM mg/dL 9.6   GLUCOSE mg/dL 213*           Lab Results (last 24 hours)     Procedure Component Value Units Date/Time    POC Glucose Once [[id number]]  (Normal) Collected:  08/03/18 0728    Specimen:  Blood Updated:  08/03/18 0736     Glucose 126 mg/dL     Narrative:       Meter: [id number] : [id number] Sea Navarro CNA    Anaerobic Culture - Swab, Leg, Right [[id number]] Collected:  08/02/18 1716    Specimen:  Swab from Leg, Right Updated:  08/02/18 1728    Wound Culture - Wound, Leg, Right [[id number]] Collected:  08/02/18 1716    Specimen:  Wound from Leg, Right Updated:   08/02/18 1728    BNP [[id number]]  (Abnormal) Collected:  08/02/18 1552    Specimen:  Blood Updated:  08/02/18 1624     proBNP 648.0 (H) pg/mL     Narrative:       Among patients with dyspnea, NT-proBNP is highly sensitive for the detection of acute congestive heart failure. In addition NT-proBNP of <300 pg/ml effectively rules out acute congestive heart failure with 99% negative predictive value.    Basic Metabolic Panel [[id number]]  (Abnormal) Collected:  08/02/18 1552    Specimen:  Blood Updated:  08/02/18 1623     Glucose 213 (H) mg/dL      BUN 19 mg/dL      Creatinine 1.06 mg/dL      Sodium 139 mmol/L      Potassium 4.1 mmol/L      Chloride 97 (L) mmol/L      CO2 31.4 (H) mmol/L      Calcium 9.6 mg/dL      eGFR Non African Amer 67 mL/min/1.73      BUN/Creatinine Ratio 17.9     Anion Gap 10.6 mmol/L     Narrative:       The MDRD GFR formula is only valid for adults with stable renal function between ages 18 and 70.    CBC (No Diff) [[id number]]  (Abnormal) Collected:  08/02/18 1552    Specimen:  Blood Updated:  08/02/18 1559     WBC 6.85 10*3/mm3      RBC 4.23 (L) 10*6/mm3      Hemoglobin 13.2 (L) g/dL      Hematocrit 39.0 (L) %      MCV 92.2 fL      MCH 31.2 (H) pg      MCHC 33.8 g/dL      RDW 14.6 (H) %      RDW-SD 48.9 fl      MPV 10.2 fL      Platelets 133 (L) 10*3/mm3               Radiology:  Imaging Results (last 24 hours)     ** No results found for the last 24 hours. **          Cardiology:  ECG/EMG Results (last 24 hours)     ** No results found for the last 24 hours. **          I have reviewed recent labs results and consult notes.    Assessment and Plan:           1.  Right leg cellulitis with superficial ulcers  much improved we'll continue IV antibiotics     2.  Paroxysmal atrial fibrillation in sinus rhythm will continue Pradaxa  rate is controlled     3.  Hypertension well controlled nothing acute home medications we continued     4.  GERD nothing acute home H2 blocker will be continued     5.   Moderate Alzheimer's dementia stable     6.  Depression anxiety Abilify will be continued

## 2018-08-03 NOTE — NURSING NOTE
[facility] consult received.  Patient with RLE open wounds, presenting as unroofed shallow blisters, etiology unknown.  Periwound skin is hot, edematous and with intense erythema.  Staff states there is some improvement but local wound care is warranted along with IV abx for soft tissue infection.  Wounds are no longer weepy, only moist with scant SS drainage.  No odor noted.  Will recommend Silvasorb sheet to open wounds and securing with Kerlix gauze wrap, changing every other day.  Patient and family agreeable to wound care plan.  Discussed with ANITA Robles.  Silvasorb supplies left at bedside for Sunday dressing change.  Thank you for consult and please don't hesitate to call with any questions.

## 2018-08-03 NOTE — PLAN OF CARE
Problem: Patient Care Overview  Goal: Discharge Needs Assessment  Outcome: Ongoing (interventions implemented as appropriate)   08/02/18 1800 08/03/18 1242 08/03/18 1253   Discharge Needs Assessment   Readmission Within the Last 30 Days --  no previous admission in last 30 days --    Concerns to be Addressed --  denies needs/concerns at this time --    Patient/Family Anticipates Transition to --  home with family --    Patient/Family Anticipated Services at Transition none --  --    Transportation Concerns --  car, none --    Transportation Anticipated --  family or friend will provide --    Discharge Coordination/Progress --  --  Patient in agreement with speaking to  with wife at bedside. He lives in a multi level home with wife. He is able to stay on one level. He has a step daughter and daughter that live close. He has been independent with ADLs and with mobility. He has a lift chair. He has no home health, other medical equipment, home oxygen, nebulizer or CPAP/BIPAP. He fills scripts at [facility] or through mail order and has no issues getting or paying for his medicines. Verfied home address and phone number. He has a Living Will; asked family to bring in a copy for the medical record. Patient says the plan is home when medically stable.  will continue to follow.    Disability   Equipment Currently Used at Home --  power chair,(recliner lift) --

## 2018-08-03 NOTE — PLAN OF CARE
Problem: Patient Care Overview  Goal: Plan of Care Review  Outcome: Ongoing (interventions implemented as appropriate)   08/03/18 1731   Coping/Psychosocial   Plan of Care Reviewed With patient;spouse   Plan of Care Review   Progress improving   OTHER   Outcome Summary patient pleasantly confused at times, family at bedside to assist, continue IV antibiotics daily, leg looking better. Wound nurse gave orders for dressing changes every other day. Anticipate home with family at discharge.        Problem: Skin and Soft Tissue Infection (Adult)  Goal: Signs and Symptoms of Listed Potential Problems Will be Absent, Minimized or Managed (Skin and Soft Tissue Infection)  Outcome: Ongoing (interventions implemented as appropriate)   08/03/18 1731   Goal/Outcome Evaluation   Problems Assessed (Skin and Soft Tissue Infection) all   Problems Present (Skin/Soft Tissue Inf) none       Problem: Fall Risk (Adult)  Goal: Identify Related Risk Factors and Signs and Symptoms  Outcome: Ongoing (interventions implemented as appropriate)   08/03/18 1731   Fall Risk (Adult)   Related Risk Factors (Fall Risk) confusion/agitation   Signs and Symptoms (Fall Risk) presence of risk factors     Goal: Absence of Fall  Outcome: Ongoing (interventions implemented as appropriate)   08/03/18 1731   Fall Risk (Adult)   Absence of Fall making progress toward outcome       Problem: Skin Injury Risk (Adult)  Goal: Identify Related Risk Factors and Signs and Symptoms  Outcome: Ongoing (interventions implemented as appropriate)   08/03/18 1731   Skin Injury Risk (Adult)   Related Risk Factors (Skin Injury Risk) advanced age;cognitive impairment;mobility impaired

## 2018-08-04 LAB
GLUCOSE BLDC GLUCOMTR-MCNC: 133 MG/DL (ref 70–130)
GLUCOSE BLDC GLUCOMTR-MCNC: 159 MG/DL (ref 70–130)
GLUCOSE BLDC GLUCOMTR-MCNC: 161 MG/DL (ref 70–130)

## 2018-08-04 PROCEDURE — 25010000002 CEFTRIAXONE PER 250 MG: Performed by: FAMILY MEDICINE

## 2018-08-04 PROCEDURE — 82962 GLUCOSE BLOOD TEST: CPT

## 2018-08-04 PROCEDURE — 94799 UNLISTED PULMONARY SVC/PX: CPT

## 2018-08-04 RX ORDER — ARIPIPRAZOLE 5 MG/1
7.5 TABLET ORAL EVERY EVENING
Status: DISCONTINUED | OUTPATIENT
Start: 2018-08-04 | End: 2018-08-06 | Stop reason: HOSPADM

## 2018-08-04 RX ORDER — FUROSEMIDE 20 MG/1
20 TABLET ORAL DAILY
Status: DISCONTINUED | OUTPATIENT
Start: 2018-08-04 | End: 2018-08-06 | Stop reason: HOSPADM

## 2018-08-04 RX ADMIN — LEVOTHYROXINE SODIUM 75 MCG: 75 TABLET ORAL at 09:06

## 2018-08-04 RX ADMIN — ASPIRIN 81 MG 81 MG: 81 TABLET ORAL at 09:05

## 2018-08-04 RX ADMIN — FAMOTIDINE 20 MG: 20 TABLET ORAL at 09:05

## 2018-08-04 RX ADMIN — FAMOTIDINE 20 MG: 20 TABLET ORAL at 20:06

## 2018-08-04 RX ADMIN — METOPROLOL SUCCINATE 25 MG: 25 TABLET, EXTENDED RELEASE ORAL at 09:06

## 2018-08-04 RX ADMIN — DABIGATRAN ETEXILATE MESYLATE 150 MG: 75 CAPSULE ORAL at 20:06

## 2018-08-04 RX ADMIN — CEFTRIAXONE 1 G: 1 INJECTION, SOLUTION INTRAVENOUS at 16:17

## 2018-08-04 RX ADMIN — ARIPIPRAZOLE 7.5 MG: 5 TABLET ORAL at 17:03

## 2018-08-04 RX ADMIN — Medication 1 TABLET: at 09:05

## 2018-08-04 RX ADMIN — FUROSEMIDE 20 MG: 20 TABLET ORAL at 14:32

## 2018-08-04 RX ADMIN — DIGOXIN 250 MCG: 250 TABLET ORAL at 09:06

## 2018-08-04 RX ADMIN — DABIGATRAN ETEXILATE MESYLATE 150 MG: 75 CAPSULE ORAL at 09:09

## 2018-08-04 NOTE — PLAN OF CARE
Problem: Patient Care Overview  Goal: Plan of Care Review  Outcome: Ongoing (interventions implemented as appropriate)   08/04/18 0512   Coping/Psychosocial   Plan of Care Reviewed With patient;family   Plan of Care Review   Progress no change   OTHER   Outcome Summary Patient confused, reinforcement needed frequenty       Problem: Skin and Soft Tissue Infection (Adult)  Goal: Signs and Symptoms of Listed Potential Problems Will be Absent, Minimized or Managed (Skin and Soft Tissue Infection)  Outcome: Ongoing (interventions implemented as appropriate)      Problem: Fall Risk (Adult)  Goal: Identify Related Risk Factors and Signs and Symptoms  Outcome: Ongoing (interventions implemented as appropriate)    Goal: Absence of Fall  Outcome: Ongoing (interventions implemented as appropriate)      Problem: Skin Injury Risk (Adult)  Goal: Identify Related Risk Factors and Signs and Symptoms  Outcome: Ongoing (interventions implemented as appropriate)    Goal: Skin Health and Integrity  Outcome: Ongoing (interventions implemented as appropriate)

## 2018-08-04 NOTE — PROGRESS NOTES
"Daily Progress Note:      Chief complaint: cellulitis, hypertension, GERD    Subjective: No complaints    Flowsheet Rows      First Filed Value   Admission Height  180.3 cm (71\") Documented at 08/02/2018 1524   Admission Weight  77.6 kg (171 lb 1.6 oz) Documented at 08/02/2018 1524          Documented weights    08/02/18 1524 08/03/18 0641 08/03/18 0700 08/04/18 0719   Weight: 77.6 kg (171 lb 1.6 oz) 77.1 kg (170 lb) 75.8 kg (167 lb) 77.6 kg (171 lb 1.6 oz)           Patient Vitals for the past 24 hrs:   BP Temp Temp src Pulse Resp SpO2 Weight   08/04/18 0907 - - - 54 - 96 % -   08/04/18 0905 - - - 56 - - -   08/04/18 0719 - - - - - - 77.6 kg (171 lb 1.6 oz)   08/04/18 0615 132/64 97.6 °F (36.4 °C) Axillary 55 16 95 % -   08/03/18 1934 151/65 97.9 °F (36.6 °C) Oral 56 16 97 % -   08/03/18 1512 133/61 96.8 °F (36 °C) Oral 55 16 95 % -   08/03/18 1315 - - - - - 94 % -       77.6 kg (171 lb 1.6 oz)      Intake/Output Summary (Last 24 hours) at 08/04/18 1208  Last data filed at 08/04/18 0859   Gross per 24 hour   Intake              770 ml   Output              100 ml   Net              670 ml       Review of Systems   Constitutional: Negative for activity change, appetite change and fatigue.   HENT: Negative for congestion.    Respiratory: Negative for cough, chest tightness, shortness of breath and wheezing.    Cardiovascular: Negative for chest pain.   Gastrointestinal: Negative for abdominal distention, abdominal pain, diarrhea, nausea and vomiting.   Endocrine: Negative for polyphagia and polyuria.   Genitourinary: Negative for frequency.   Skin: Positive for color change and wound. Negative for rash.   Neurological: Negative for light-headedness.   Hematological: Does not bruise/bleed easily.   Psychiatric/Behavioral: Negative for agitation and behavioral problems.       Physical Exam   Constitutional: He appears well-developed and well-nourished.   HENT:   Head: Normocephalic.   Mouth/Throat: Oropharynx is clear " and moist.   Eyes: Conjunctivae are normal.   Neck: Normal range of motion. No JVD present. No thyromegaly present.   Cardiovascular: Normal rate and regular rhythm.    Murmur heard.   Systolic murmur is present with a grade of 2/6   Pulmonary/Chest: Effort normal and breath sounds normal. No respiratory distress. He has no wheezes. He has no rales.   Abdominal: Soft. Bowel sounds are normal. He exhibits no distension. There is no tenderness. There is no guarding.   Neurological: He is alert.   Skin: Skin is warm and dry. No rash noted.   Leg looks worse today greasy redness ulcers are more moist and weeping new treatment modality   Nursing note and vitals reviewed.          Medication Review:     Medication Review:   I have reviewed the patient's current medication list    ARIPiprazole 5 mg Oral Q PM   aspirin 81 mg Oral Daily   ceftriaxone 1 g Intravenous Q24H   dabigatran etexilate 150 mg Oral Q12H   digoxin 250 mcg Oral Daily   famotidine 20 mg Oral BID   levothyroxine 75 mcg Oral Daily   metoprolol succinate XL 25 mg Oral Daily   multivitamin with minerals 1 tablet Oral Daily         Labs:    Results from last 7 days  Lab Units 08/02/18  1552   WBC 10*3/mm3 6.85   HEMOGLOBIN g/dL 13.2*   HEMATOCRIT % 39.0*   PLATELETS 10*3/mm3 133*       Results from last 7 days  Lab Units 08/02/18  1552   SODIUM mmol/L 139   POTASSIUM mmol/L 4.1   CHLORIDE mmol/L 97*   CO2 mmol/L 31.4*   BUN mg/dL 19   CREATININE mg/dL 1.06   CALCIUM mg/dL 9.6   GLUCOSE mg/dL 213*           Lab Results (last 24 hours)     Procedure Component Value Units Date/Time    POC Glucose Once [[id number]]  (Abnormal) Collected:  08/04/18 0809    Specimen:  Blood Updated:  08/04/18 0815     Glucose 133 (H) mg/dL     Narrative:       Meter: [id number] : [id number] Gabino Farah NURSING ASSISTANT    POC Glucose Once [[id number]]  (Abnormal) Collected:  08/04/18 0806    Specimen:  Blood Updated:  08/04/18 0815     Glucose 161 (H) mg/dL     Narrative:        Repeat Test PER ELI Meter: [id number] : [id number] Gabino MARKHAM    POC Glucose Once [[id number]]  (Normal) Collected:  08/03/18 1706    Specimen:  Blood Updated:  08/03/18 1715     Glucose 120 mg/dL     Narrative:       Meter: [id number] : [id number] Seamarky Martinezn CNA    Wound Culture - Wound, Leg, Right [[id number]]  (Normal) Collected:  08/02/18 1716    Specimen:  Wound from Leg, Right Updated:  08/03/18 1231     Wound Culture Culture in progress     Gram Stain Result Occasional Gram positive cocci      No WBCs per low power field              Radiology:  Imaging Results (last 24 hours)     ** No results found for the last 24 hours. **          Cardiology:  ECG/EMG Results (last 24 hours)     ** No results found for the last 24 hours. **          I have reviewed recent labs results and consult notes.    Assessment and Plan:           1.  Right leg cellulitis with superficial ulcers  much improved we'll continue IV antibiotics, will change local wound treatment     2.  Paroxysmal atrial fibrillation in sinus rhythm will continue Pradaxa  rate is controlled     3.  Hypertension well controlled nothing acute home medications we continued     4.  GERD nothing acute home H2 blocker will be continued     5.  Moderate Alzheimer's dementia stable     6.  Depression anxiety Abilify will be continued

## 2018-08-04 NOTE — PLAN OF CARE
Problem: Patient Care Overview  Goal: Plan of Care Review  Outcome: Ongoing (interventions implemented as appropriate)   08/04/18 0779   Coping/Psychosocial   Plan of Care Reviewed With patient;spouse   Plan of Care Review   Progress no change   OTHER   Outcome Summary continue with IV antibiotics, started on po lasix,new orders for dressing change,.remains confused     Goal: Individualization and Mutuality  Outcome: Ongoing (interventions implemented as appropriate)    Goal: Discharge Needs Assessment  Outcome: Ongoing (interventions implemented as appropriate)      Problem: Skin and Soft Tissue Infection (Adult)  Goal: Signs and Symptoms of Listed Potential Problems Will be Absent, Minimized or Managed (Skin and Soft Tissue Infection)  Outcome: Ongoing (interventions implemented as appropriate)      Problem: Fall Risk (Adult)  Goal: Identify Related Risk Factors and Signs and Symptoms  Outcome: Outcome(s) achieved Date Met: 08/04/18    Goal: Absence of Fall  Outcome: Ongoing (interventions implemented as appropriate)      Problem: Skin Injury Risk (Adult)  Goal: Identify Related Risk Factors and Signs and Symptoms  Outcome: Outcome(s) achieved Date Met: 08/04/18    Goal: Skin Health and Integrity  Outcome: Ongoing (interventions implemented as appropriate)

## 2018-08-04 NOTE — NURSING NOTE
Continued Stay Note  [facility]     Patient Name: Akshat Jimenez  MRN: [id number]  Today's Date: 8/4/2018    Admit Date: 8/2/2018          Discharge Plan     Row Name 08/04/18 1233       Plan    Plan Comments spoke with wife at bedside r/t changing to inpatient status. signed IMM. denied copy. will continue to follow.               Discharge Codes    No documentation.           Juana Lazcano RN

## 2018-08-05 LAB
ANION GAP SERPL CALCULATED.3IONS-SCNC: 13.3 MMOL/L
BUN BLD-MCNC: 22 MG/DL (ref 8–23)
BUN/CREAT SERPL: 20.8 (ref 7–25)
CALCIUM SPEC-SCNC: 10 MG/DL (ref 8.8–10.5)
CHLORIDE SERPL-SCNC: 99 MMOL/L (ref 98–107)
CO2 SERPL-SCNC: 29.7 MMOL/L (ref 22–29)
CREAT BLD-MCNC: 1.06 MG/DL (ref 0.76–1.27)
GFR SERPL CREATININE-BSD FRML MDRD: 67 ML/MIN/1.73
GLUCOSE BLD-MCNC: 127 MG/DL (ref 65–99)
GLUCOSE BLDC GLUCOMTR-MCNC: 128 MG/DL (ref 70–130)
GLUCOSE BLDC GLUCOMTR-MCNC: 175 MG/DL (ref 70–130)
POTASSIUM BLD-SCNC: 4.1 MMOL/L (ref 3.5–5.2)
SODIUM BLD-SCNC: 142 MMOL/L (ref 136–145)

## 2018-08-05 PROCEDURE — 80048 BASIC METABOLIC PNL TOTAL CA: CPT | Performed by: FAMILY MEDICINE

## 2018-08-05 PROCEDURE — 94799 UNLISTED PULMONARY SVC/PX: CPT

## 2018-08-05 PROCEDURE — 82962 GLUCOSE BLOOD TEST: CPT

## 2018-08-05 RX ORDER — CEPHALEXIN 500 MG/1
500 CAPSULE ORAL EVERY 8 HOURS SCHEDULED
Status: DISCONTINUED | OUTPATIENT
Start: 2018-08-05 | End: 2018-08-06 | Stop reason: HOSPADM

## 2018-08-05 RX ADMIN — CEPHALEXIN 500 MG: 500 CAPSULE ORAL at 15:29

## 2018-08-05 RX ADMIN — DIGOXIN 250 MCG: 250 TABLET ORAL at 09:00

## 2018-08-05 RX ADMIN — DABIGATRAN ETEXILATE MESYLATE 150 MG: 75 CAPSULE ORAL at 08:35

## 2018-08-05 RX ADMIN — LEVOTHYROXINE SODIUM 75 MCG: 75 TABLET ORAL at 08:38

## 2018-08-05 RX ADMIN — CEPHALEXIN 500 MG: 500 CAPSULE ORAL at 22:07

## 2018-08-05 RX ADMIN — Medication 1 TABLET: at 08:37

## 2018-08-05 RX ADMIN — DABIGATRAN ETEXILATE MESYLATE 150 MG: 75 CAPSULE ORAL at 20:18

## 2018-08-05 RX ADMIN — ARIPIPRAZOLE 7.5 MG: 5 TABLET ORAL at 18:04

## 2018-08-05 RX ADMIN — FAMOTIDINE 20 MG: 20 TABLET ORAL at 08:37

## 2018-08-05 RX ADMIN — FAMOTIDINE 20 MG: 20 TABLET ORAL at 20:18

## 2018-08-05 RX ADMIN — METOPROLOL SUCCINATE 25 MG: 25 TABLET, EXTENDED RELEASE ORAL at 08:38

## 2018-08-05 RX ADMIN — ASPIRIN 81 MG 81 MG: 81 TABLET ORAL at 08:34

## 2018-08-05 RX ADMIN — FUROSEMIDE 20 MG: 20 TABLET ORAL at 08:37

## 2018-08-05 NOTE — PROGRESS NOTES
"Daily Progress Note:      Chief complaint: cellulitis, hypertension, GERD    Subjective: No complaints    Flowsheet Rows      First Filed Value   Admission Height  180.3 cm (71\") Documented at 08/02/2018 1524   Admission Weight  77.6 kg (171 lb 1.6 oz) Documented at 08/02/2018 1524          Documented weights    08/02/18 1524 08/03/18 0641 08/03/18 0700 08/04/18 0719   Weight: 77.6 kg (171 lb 1.6 oz) 77.1 kg (170 lb) 75.8 kg (167 lb) 77.6 kg (171 lb 1.6 oz)           Patient Vitals for the past 24 hrs:   BP Temp Temp src Pulse Resp SpO2   08/05/18 0900 - - - 61 - -   08/05/18 0855 - - - 50 - 96 %   08/05/18 0852 - - - 54 - -   08/05/18 0637 143/71 97.4 °F (36.3 °C) Oral 56 18 96 %   08/04/18 2305 148/64 98.4 °F (36.9 °C) Oral 59 18 94 %   08/04/18 1933 - - - - - 97 %   08/04/18 1917 144/58 98.4 °F (36.9 °C) Oral 55 18 98 %   08/04/18 1509 153/70 97.7 °F (36.5 °C) Oral 58 18 99 %       77.6 kg (171 lb 1.6 oz)      Intake/Output Summary (Last 24 hours) at 08/05/18 1205  Last data filed at 08/05/18 0900   Gross per 24 hour   Intake              530 ml   Output                0 ml   Net              530 ml       Review of Systems   Constitutional: Negative for activity change, appetite change and fatigue.   HENT: Negative for congestion.    Respiratory: Negative for cough, chest tightness, shortness of breath and wheezing.    Cardiovascular: Negative for chest pain.   Gastrointestinal: Negative for abdominal distention, abdominal pain, diarrhea, nausea and vomiting.   Endocrine: Negative for polyphagia and polyuria.   Genitourinary: Negative for frequency.   Skin: Positive for color change and wound. Negative for rash.   Neurological: Negative for light-headedness.   Hematological: Does not bruise/bleed easily.   Psychiatric/Behavioral: Negative for agitation and behavioral problems.       Physical Exam   Constitutional: He appears well-developed and well-nourished.   HENT:   Head: Normocephalic.   Mouth/Throat: " Oropharynx is clear and moist.   Eyes: Conjunctivae are normal.   Neck: Normal range of motion. No JVD present. No thyromegaly present.   Cardiovascular: Normal rate and regular rhythm.    Murmur heard.   Systolic murmur is present with a grade of 2/6   Pulmonary/Chest: Effort normal and breath sounds normal. No respiratory distress. He has no wheezes. He has no rales.   Abdominal: Soft. Bowel sounds are normal. He exhibits no distension. There is no tenderness. There is no guarding.   Neurological: He is alert.   Skin: Skin is warm and dry. No rash noted.   Skin looks better today.  The ulcers are pretty superficial and no drainage   Nursing note and vitals reviewed.          Medication Review:     Medication Review:   I have reviewed the patient's current medication list    ARIPiprazole 7.5 mg Oral Q PM   aspirin 81 mg Oral Daily   ceftriaxone 1 g Intravenous Q24H   dabigatran etexilate 150 mg Oral Q12H   digoxin 250 mcg Oral Daily   famotidine 20 mg Oral BID   furosemide 20 mg Oral Daily   levothyroxine 75 mcg Oral Daily   metoprolol succinate XL 25 mg Oral Daily   multivitamin with minerals 1 tablet Oral Daily         Labs:    Results from last 7 days  Lab Units 08/02/18  1552   WBC 10*3/mm3 6.85   HEMOGLOBIN g/dL 13.2*   HEMATOCRIT % 39.0*   PLATELETS 10*3/mm3 133*       Results from last 7 days  Lab Units 08/05/18  0350 08/02/18  1552   SODIUM mmol/L 142 139   POTASSIUM mmol/L 4.1 4.1   CHLORIDE mmol/L 99 97*   CO2 mmol/L 29.7* 31.4*   BUN mg/dL 22 19   CREATININE mg/dL 1.06 1.06   CALCIUM mg/dL 10.0 9.6   GLUCOSE mg/dL 127* 213*           Lab Results (last 24 hours)     Procedure Component Value Units Date/Time    POC Glucose Once [[id number]]  (Normal) Collected:  08/05/18 0745    Specimen:  Blood Updated:  08/05/18 0751     Glucose 128 mg/dL     Narrative:       Meter: [id number] : [id number] Sea Navarro CNA    Basic Metabolic Panel [[id number]]  (Abnormal) Collected:  08/05/18 0350    Specimen:  Blood  Updated:  08/05/18 0533     Glucose 127 (H) mg/dL      BUN 22 mg/dL      Creatinine 1.06 mg/dL      Sodium 142 mmol/L      Potassium 4.1 mmol/L      Chloride 99 mmol/L      CO2 29.7 (H) mmol/L      Calcium 10.0 mg/dL      eGFR Non African Amer 67 mL/min/1.73      BUN/Creatinine Ratio 20.8     Anion Gap 13.3 mmol/L     Narrative:       The MDRD GFR formula is only valid for adults with stable renal function between ages 18 and 70.    POC Glucose Once [[id number]]  (Abnormal) Collected:  08/04/18 1659    Specimen:  Blood Updated:  08/04/18 1707     Glucose 159 (H) mg/dL     Narrative:       Meter: [id number] : [id number] Gabino Farah NURSING ASSISTANT              Radiology:  Imaging Results (last 24 hours)     ** No results found for the last 24 hours. **          Cardiology:  ECG/EMG Results (last 24 hours)     ** No results found for the last 24 hours. **          I have reviewed recent labs results and consult notes.    Assessment and Plan:           1.  Right leg cellulitis with superficial ulcers  much improved change to by mouth antibiotics likely home tomorrow   2.  Paroxysmal atrial fibrillation in sinus rhythm will continue Pradaxa  rate is controlled     3.  Hypertension well controlled nothing acute home medications we continued     4.  GERD nothing acute home H2 blocker will be continued     5.  Moderate Alzheimer's dementia stable     6.  Depression anxiety Abilify will be continued

## 2018-08-06 VITALS
RESPIRATION RATE: 16 BRPM | WEIGHT: 166.8 LBS | BODY MASS INDEX: 23.35 KG/M2 | HEIGHT: 71 IN | TEMPERATURE: 96.9 F | DIASTOLIC BLOOD PRESSURE: 68 MMHG | OXYGEN SATURATION: 96 % | SYSTOLIC BLOOD PRESSURE: 125 MMHG | HEART RATE: 54 BPM

## 2018-08-06 LAB
BACTERIA SPEC AEROBE CULT: NORMAL
GLUCOSE BLDC GLUCOMTR-MCNC: 116 MG/DL (ref 70–130)
GRAM STN SPEC: NORMAL
GRAM STN SPEC: NORMAL

## 2018-08-06 PROCEDURE — 82962 GLUCOSE BLOOD TEST: CPT

## 2018-08-06 RX ORDER — CEPHALEXIN 500 MG/1
500 CAPSULE ORAL EVERY 8 HOURS SCHEDULED
Qty: 18 CAPSULE | Refills: 0 | Status: SHIPPED | OUTPATIENT
Start: 2018-08-06 | End: 2018-08-12

## 2018-08-06 RX ADMIN — CEPHALEXIN 500 MG: 500 CAPSULE ORAL at 06:36

## 2018-08-06 RX ADMIN — METOPROLOL SUCCINATE 25 MG: 25 TABLET, EXTENDED RELEASE ORAL at 08:33

## 2018-08-06 RX ADMIN — FUROSEMIDE 20 MG: 20 TABLET ORAL at 08:33

## 2018-08-06 RX ADMIN — DABIGATRAN ETEXILATE MESYLATE 150 MG: 75 CAPSULE ORAL at 08:37

## 2018-08-06 RX ADMIN — FAMOTIDINE 20 MG: 20 TABLET ORAL at 08:33

## 2018-08-06 RX ADMIN — DIGOXIN 250 MCG: 250 TABLET ORAL at 08:33

## 2018-08-06 RX ADMIN — Medication 1 TABLET: at 08:33

## 2018-08-06 RX ADMIN — LEVOTHYROXINE SODIUM 75 MCG: 75 TABLET ORAL at 08:33

## 2018-08-06 RX ADMIN — ASPIRIN 81 MG 81 MG: 81 TABLET ORAL at 08:33

## 2018-08-06 NOTE — DISCHARGE SUMMARY
Akshat HEATH Tony  1935  [id number]        Discharge Summary    Date of Admission: 8/2/2018  Date of Discharge:  8/6/2018    Primary Discharge Diagnoses:   Cellulitis of the right lower extremity with superficial ulcers    Secondary Discharge Diagnoses:   Sick sinus syndrome  Paroxysmal atrial fibrillation  Alzheimer's dementia  Major depression with atypical features  Hypertension  Hyperlipidemia  PCP  Patient Care Team:  Adalberto Hudson MD as PCP - General  Adalberto Hudson MD as PCP - Family Medicine    Consults:   Consults     No orders found from 7/4/2018 to 8/3/2018.            History of Present Illness:  82-year-old white male with chronic edema with several episodes of prior cellulitis of his right leg who developed some blisters about 5 days ago.  He also suffers from developed some redness and his wife states that he is getting increasing blisters and weeping with some ulcers.  Subsequently the right leg became increasingly red and painful.  Start oral Keflex without any improvement    Hospital Course  Admission the hospital cultures were done.  He'll start IV Rocephin.  He tolerated this well.  His edema improved his redness and cellulitis improved.  Blisters and superficial ulcers also improved.  Cultures remain negative his change her to by mouth Keflex discharged satisfactory condition      Operations and Procedures Performed:       Xr Ribs Left With Pa Chest    Result Date: 7/16/2018  Narrative: X-RAY RIBS LEFT WITH PA CHEST.  HISTORY: Left anterior lateral rib pain, fall.  COMPARISON: No prior studies for comparison.  FINDINGS: Mild cardiomegaly remains.  No displaced rib fracture is identified, there is no pneumothorax.  There is no consolidation or effusion.         Impression: No acute findings.     This report was finalized on 7/16/2018 2:14 AM by Dr. Jaz Jones M.D.        Last Lab Results:     Results from last 7 days  Lab Units 08/02/18  1552   WBC 10*3/mm3 6.85    HEMOGLOBIN g/dL 13.2*   HEMATOCRIT % 39.0*   PLATELETS 10*3/mm3 133*       Results from last 7 days  Lab Units 08/05/18  0350 08/02/18  1552   SODIUM mmol/L 142 139   POTASSIUM mmol/L 4.1 4.1   CHLORIDE mmol/L 99 97*   CO2 mmol/L 29.7* 31.4*   BUN mg/dL 22 19   CREATININE mg/dL 1.06 1.06   CALCIUM mg/dL 10.0 9.6   GLUCOSE mg/dL 127* 213*           Lab Results (last 24 hours)     Procedure Component Value Units Date/Time    POC Glucose Once [[id number]]  (Abnormal) Collected:  08/05/18 1648    Specimen:  Blood Updated:  08/05/18 1655     Glucose 175 (H) mg/dL     Narrative:       Meter: [id number] : [id number] Clinton Bettencourt RN    Anaerobic Culture - Swab, Leg, Right [[id number]]  (Normal) Collected:  08/02/18 1716    Specimen:  Swab from Leg, Right Updated:  08/05/18 1406     Culture No anaerobes isolated at 3 days    Wound Culture - Wound, Leg, Right [[id number]] Collected:  08/02/18 1716    Specimen:  Wound from Leg, Right Updated:  08/05/18 1208     Wound Culture Scant growth (1+) Normal Skin Lisset     Gram Stain Result Occasional Gram positive cocci      No WBCs per low power field    POC Glucose Once [[id number]]  (Normal) Collected:  08/05/18 0745    Specimen:  Blood Updated:  08/05/18 0751     Glucose 128 mg/dL     Narrative:       Meter: [id number] : [id number] Sea Navarro CNA              PROCEDURES          Allergies:  has No Known Allergies.    George  reviewed    Discharge Medications:     Discharge Medications      New Medications      Instructions Start Date   cephalexin 500 MG capsule  Commonly known as:  KEFLEX   500 mg, Oral, Every 8 Hours Scheduled         Continue These Medications      Instructions Start Date   ARIPiprazole 5 MG tablet  Commonly known as:  ABILIFY   7.5 mg, Oral, Every Evening      aspirin 81 MG chewable tablet   81 mg, Oral, Daily      dabigatran etexilate 150 MG capsu  Commonly known as:  PRADAXA   150 mg, Oral, 2 Times Daily      digoxin 250 MCG tablet  Commonly known  as:  LANOXIN   250 mcg, Oral      furosemide 20 MG tablet  Commonly known as:  LASIX   20 mg, Oral, 2 Times Daily      levothyroxine 75 MCG tablet  Commonly known as:  SYNTHROID, LEVOTHROID   75 mcg, Oral, Daily      metoprolol succinate XL 25 MG 24 hr tablet  Commonly known as:  TOPROL-XL   25 mg, Oral, Daily      multivitamin with minerals tablet tablet   1 tablet, Oral, Daily      raNITIdine 150 MG tablet  Commonly known as:  ZANTAC   150 mg, Oral, 2 Times Daily      simvastatin 40 MG tablet  Commonly known as:  ZOCOR   40 mg, Oral, Nightly               Condition on Discharge:  Stable    Discharge Disposition  home    Visiting Nurse:    No     Home PT/OT:  No     Home Safety Evaluation:  No     DME  none    Discharge Diet:      Dietary Orders     Start     Ordered    08/02/18 1535  Diet Regular; Cardiac, Consistent Carbohydrate, Renal  Diet Effective Now     Question Answer Comment   Diet Texture / Consistency Regular    Common Modifiers Cardiac    Common Modifiers Consistent Carbohydrate    Common Modifiers Renal        08/02/18 1536          Activity at Discharge:  As tolerated  Normal saline wet-to-dry to right lower extremity at night      Follow-up Appointments:  With my office as instructed    Test Results Pending at Discharge   Order Current Status    Anaerobic Culture - Swab, Leg, Right Preliminary result    Wound Culture - Wound, Leg, Right Preliminary result           Adalberto Hudson MD  08/06/18  7:02 AM

## 2018-08-06 NOTE — PROGRESS NOTES
"Daily Progress Note:      Chief complaint: cellulitis, hypertension, GERD    Subjective: No complaints    Flowsheet Rows      First Filed Value   Admission Height  180.3 cm (71\") Documented at 08/02/2018 1524   Admission Weight  77.6 kg (171 lb 1.6 oz) Documented at 08/02/2018 1524          Documented weights    08/02/18 1524 08/03/18 0641 08/03/18 0700 08/04/18 0719   Weight: 77.6 kg (171 lb 1.6 oz) 77.1 kg (170 lb) 75.8 kg (167 lb) 77.6 kg (171 lb 1.6 oz)           Patient Vitals for the past 24 hrs:   BP Temp Temp src Pulse Resp SpO2   08/06/18 0613 125/68 96.9 °F (36.1 °C) Oral 54 16 96 %   08/05/18 2311 139/69 97 °F (36.1 °C) Oral 54 18 97 %   08/05/18 1955 - - - - - 96 %   08/05/18 1911 159/71 98.4 °F (36.9 °C) Oral 57 16 97 %   08/05/18 1518 119/55 98.1 °F (36.7 °C) Oral 54 16 97 %   08/05/18 0900 - - - 61 - -   08/05/18 0855 - - - 50 - 96 %   08/05/18 0852 - - - 54 - -       77.6 kg (171 lb 1.6 oz)      Intake/Output Summary (Last 24 hours) at 08/06/18 0659  Last data filed at 08/05/18 1700   Gross per 24 hour   Intake              720 ml   Output                0 ml   Net              720 ml       Review of Systems   Constitutional: Negative for activity change, appetite change and fatigue.   HENT: Negative for congestion.    Respiratory: Negative for cough, chest tightness, shortness of breath and wheezing.    Cardiovascular: Negative for chest pain.   Gastrointestinal: Negative for abdominal distention, abdominal pain, diarrhea, nausea and vomiting.   Endocrine: Negative for polyphagia and polyuria.   Genitourinary: Negative for frequency.   Skin: Positive for color change and wound. Negative for rash.   Neurological: Negative for light-headedness.   Hematological: Does not bruise/bleed easily.   Psychiatric/Behavioral: Negative for agitation and behavioral problems.       Physical Exam   Constitutional: He appears well-developed and well-nourished.   HENT:   Head: Normocephalic.   Mouth/Throat: " Oropharynx is clear and moist.   Eyes: Conjunctivae are normal.   Neck: Normal range of motion. No JVD present. No thyromegaly present.   Cardiovascular: Normal rate and regular rhythm.    Murmur heard.   Systolic murmur is present with a grade of 2/6   Pulmonary/Chest: Effort normal and breath sounds normal. No respiratory distress. He has no wheezes. He has no rales.   Abdominal: Soft. Bowel sounds are normal. He exhibits no distension. There is no tenderness. There is no guarding.   Neurological: He is alert.   Skin: Skin is warm and dry. No rash noted.   Skin looks better today.  The ulcers are pretty superficial and no drainage   Nursing note and vitals reviewed.          Medication Review:     Medication Review:   I have reviewed the patient's current medication list    ARIPiprazole 7.5 mg Oral Q PM   aspirin 81 mg Oral Daily   cephalexin 500 mg Oral Q8H   dabigatran etexilate 150 mg Oral Q12H   digoxin 250 mcg Oral Daily   famotidine 20 mg Oral BID   furosemide 20 mg Oral Daily   levothyroxine 75 mcg Oral Daily   metoprolol succinate XL 25 mg Oral Daily   multivitamin with minerals 1 tablet Oral Daily         Labs:    Results from last 7 days  Lab Units 08/02/18  1552   WBC 10*3/mm3 6.85   HEMOGLOBIN g/dL 13.2*   HEMATOCRIT % 39.0*   PLATELETS 10*3/mm3 133*       Results from last 7 days  Lab Units 08/05/18  0350 08/02/18  1552   SODIUM mmol/L 142 139   POTASSIUM mmol/L 4.1 4.1   CHLORIDE mmol/L 99 97*   CO2 mmol/L 29.7* 31.4*   BUN mg/dL 22 19   CREATININE mg/dL 1.06 1.06   CALCIUM mg/dL 10.0 9.6   GLUCOSE mg/dL 127* 213*           Lab Results (last 24 hours)     Procedure Component Value Units Date/Time    POC Glucose Once [[id number]]  (Abnormal) Collected:  08/05/18 1648    Specimen:  Blood Updated:  08/05/18 1655     Glucose 175 (H) mg/dL     Narrative:       Meter: [id number] : [id number] Clinton Bettencourt RN    Anaerobic Culture - Swab, Leg, Right [[id number]]  (Normal) Collected:  08/02/18 1716     Specimen:  Swab from Leg, Right Updated:  08/05/18 1406     Culture No anaerobes isolated at 3 days    Wound Culture - Wound, Leg, Right [[id number]] Collected:  08/02/18 1716    Specimen:  Wound from Leg, Right Updated:  08/05/18 1208     Wound Culture Scant growth (1+) Normal Skin Lisset     Gram Stain Result Occasional Gram positive cocci      No WBCs per low power field    POC Glucose Once [[id number]]  (Normal) Collected:  08/05/18 0745    Specimen:  Blood Updated:  08/05/18 0751     Glucose 128 mg/dL     Narrative:       Meter: [id number] : [id number] Sea Navarro CNA              Radiology:  Imaging Results (last 24 hours)     ** No results found for the last 24 hours. **          Cardiology:  ECG/EMG Results (last 24 hours)     ** No results found for the last 24 hours. **          I have reviewed recent labs results and consult notes.    Assessment and Plan:           1.  Right leg cellulitis with superficial ulcers  much improved home today  2.  Paroxysmal atrial fibrillation in sinus rhythm will continue Pradaxa  rate is controlled     3.  Hypertension well controlled nothing acute home medications we continued     4.  GERD nothing acute home H2 blocker will be continued     5.  Moderate Alzheimer's dementia stable     6.  Depression anxiety Abilify will be continued

## 2018-08-06 NOTE — NURSING NOTE
Case Management Discharge Note    Final Note: Discharged home    Destination     No service has been selected for the patient.      Durable Medical Equipment     No service has been selected for the patient.      Dialysis/Infusion     No service has been selected for the patient.      Home Medical Care     No service has been selected for the patient.      Social Care     No service has been selected for the patient.             Final Discharge Disposition Code: 01 - home or self-care

## 2018-08-06 NOTE — PLAN OF CARE
Problem: Patient Care Overview  Goal: Plan of Care Review  Outcome: Outcome(s) achieved Date Met: 08/06/18      Problem: Skin and Soft Tissue Infection (Adult)  Goal: Signs and Symptoms of Listed Potential Problems Will be Absent, Minimized or Managed (Skin and Soft Tissue Infection)  Outcome: Outcome(s) achieved Date Met: 08/06/18      Problem: Fall Risk (Adult)  Goal: Absence of Fall  Outcome: Outcome(s) achieved Date Met: 08/06/18      Problem: Skin Injury Risk (Adult)  Goal: Skin Health and Integrity  Outcome: Outcome(s) achieved Date Met: 08/06/18

## 2018-08-07 ENCOUNTER — READMISSION MANAGEMENT (OUTPATIENT)
Dept: CALL CENTER | Facility: HOSPITAL | Age: 83
End: 2018-08-07

## 2018-08-07 LAB — BACTERIA SPEC ANAEROBE CULT: NORMAL

## 2018-08-07 NOTE — OUTREACH NOTE
Prep Survey      Responses   Facility patient discharged from?  [facility]   Is LACE score < 7 ?  No   Is patient eligible?  Yes   Discharge diagnosis  Cellulitis of the right lower extremity with superficial ulcers   Does the patient have one of the following disease processes/diagnoses(primary or secondary)?  Other   Does the patient have Home health ordered?  No   Is there a DME ordered?  No   General alerts for this patient  Alzheimer's dementia   Prep survey completed?  Yes          Brittni Alonzo RN

## 2018-08-08 ENCOUNTER — READMISSION MANAGEMENT (OUTPATIENT)
Dept: CALL CENTER | Facility: HOSPITAL | Age: 83
End: 2018-08-08

## 2018-08-16 ENCOUNTER — READMISSION MANAGEMENT (OUTPATIENT)
Dept: CALL CENTER | Facility: HOSPITAL | Age: 83
End: 2018-08-16

## 2018-08-16 NOTE — OUTREACH NOTE
Medical Week 2 Survey      Responses   Facility patient discharged from?  [facility]   Does the patient have one of the following disease processes/diagnoses(primary or secondary)?  Other   Week 2 attempt successful?  Yes   Call start time  1610   General alerts for this patient  Alzheimer's dementia   Discharge diagnosis  Cellulitis of the right lower extremity with superficial ulcers   Call end time  1615   Is patient permission given to speak with other caregiver?  Yes   List who call center can speak with  Ruby Jimenez, spouse   Person spoke with today (if not patient) and relationship  Ruby Jimenez, spouse   Meds reviewed with patient/caregiver?  Yes   Is the patient having any side effects they believe may be caused by any medication additions or changes?  No   Does the patient have all medications ordered at discharge?  Yes   Is the patient taking all medications as directed (includes completed medication regime)?  Yes   Does the patient have an appointment with their PCP within 7 days of discharge?  Yes   Comments regarding PCP  PCP Adalberto Hudson MD. Patient saw on 8/9/18 and wife states has another return appt scheduled for next week.    Has the patient kept scheduled appointments due by today?  Yes   Has home health visited the patient within 72 hours of discharge?  N/A   Psychosocial issues?  No   Did the patient receive a copy of their discharge instructions?  Yes   Nursing interventions  Reviewed instructions with patient   What is the patient's perception of their health status since discharge?  Improving   Is the patient/caregiver able to teach back signs and symptoms related to disease process for when to call PCP?  Yes   Is the patient/caregiver able to teach back signs and symptoms related to disease process for when to call 911?  Yes   Is the patient/caregiver able to teach back the hierarchy of who to call/visit for symptoms/problems? PCP, Specialist, Home health nurse, Urgent Care,  ED, 911  Yes   Additional teach back comments  Wife states blisters on leg now dry with scabs. No increased redness, swelling, drainage.    Week 2 Call Completed?  Yes          Enedina Marcos RN

## 2018-08-23 ENCOUNTER — READMISSION MANAGEMENT (OUTPATIENT)
Dept: CALL CENTER | Facility: HOSPITAL | Age: 83
End: 2018-08-23

## 2018-08-23 NOTE — OUTREACH NOTE
Medical Week 3 Survey      Responses   Facility patient discharged from?  [facility]   Does the patient have one of the following disease processes/diagnoses(primary or secondary)?  Other   Week 3 attempt successful?  No   Unsuccessful attempts  Attempt 1          Adrian Shea RN

## 2018-08-27 ENCOUNTER — READMISSION MANAGEMENT (OUTPATIENT)
Dept: CALL CENTER | Facility: HOSPITAL | Age: 83
End: 2018-08-27

## 2018-08-27 NOTE — OUTREACH NOTE
Medical Week 3 Survey      Responses   Facility patient discharged from?  [facility]   Does the patient have one of the following disease processes/diagnoses(primary or secondary)?  Other   Week 3 attempt successful?  Yes   Call start time  1108   Call end time  1113   General alerts for this patient  Alzheimer's dementia   Discharge diagnosis  Cellulitis of the right lower extremity with superficial ulcers   Is patient permission given to speak with other caregiver?  Yes   List who call center can speak with  Ruby Jimenez, spouse   Person spoke with today (if not patient) and relationship  Ruby Jimenez, spouse   Meds reviewed with patient/caregiver?  Yes   Is the patient having any side effects they believe may be caused by any medication additions or changes?  No   Does the patient have all medications ordered at discharge?  Yes   Is the patient taking all medications as directed (includes completed medication regime)?  Yes   Does the patient have a primary care provider?   Yes   Comments regarding PCP  PCP Adalberto Hudson MD. Patient saw on 8/9/18 and wife states has another return appt scheduled for next week.    Does the patient have an appointment with their PCP within 7 days of discharge?  Yes   Has the patient kept scheduled appointments due by today?  Yes   Comments  pt has appt with Dr Cook on 8/10/18 for pacemaker check has followed up with them    Has home health visited the patient within 72 hours of discharge?  N/A   Psychosocial issues?  No   Comments  pt leaves ulcer open to air, no dressing   Did the patient receive a copy of their discharge instructions?  Yes   Nursing interventions  Reviewed instructions with patient   What is the patient's perception of their health status since discharge?  Improving   Is the patient/caregiver able to teach back signs and symptoms related to disease process for when to call PCP?  Yes   Is the patient/caregiver able to teach back signs and symptoms  related to disease process for when to call 911?  Yes   Additional teach back comments  Wife states blisters on leg now dry with scabs. No increased redness, swelling, drainage.    Week 3 Call Completed?  Yes          Aure Wright RN

## 2018-09-04 ENCOUNTER — READMISSION MANAGEMENT (OUTPATIENT)
Dept: CALL CENTER | Facility: HOSPITAL | Age: 83
End: 2018-09-04

## 2018-09-04 NOTE — OUTREACH NOTE
Medical Week 4 Survey      Responses   Facility patient discharged from?  [facility]   Does the patient have one of the following disease processes/diagnoses(primary or secondary)?  Other   Week 4 attempt successful?  No          Nicole Ford RN

## 2018-09-29 ENCOUNTER — HOSPITAL ENCOUNTER (OUTPATIENT)
Dept: GENERAL RADIOLOGY | Facility: HOSPITAL | Age: 83
Discharge: HOME | End: 2018-09-29
Attending: FAMILY MEDICINE | Admitting: FAMILY MEDICINE

## 2018-09-29 ENCOUNTER — TRANSCRIBE ORDERS (OUTPATIENT)
Dept: ADMINISTRATIVE | Facility: HOSPITAL | Age: 83
End: 2018-09-29

## 2018-09-29 DIAGNOSIS — M25.552 LEFT HIP PAIN: ICD-10-CM

## 2018-09-29 DIAGNOSIS — M25.552 LEFT HIP PAIN: Primary | ICD-10-CM

## 2018-09-29 PROCEDURE — 73502 X-RAY EXAM HIP UNI 2-3 VIEWS: CPT

## 2018-10-01 ENCOUNTER — APPOINTMENT (OUTPATIENT)
Dept: GENERAL RADIOLOGY | Facility: HOSPITAL | Age: 83
End: 2018-10-01
Attending: FAMILY MEDICINE

## 2018-12-06 ENCOUNTER — PREP FOR SURGERY (OUTPATIENT)
Dept: OTHER | Facility: HOSPITAL | Age: 83
End: 2018-12-06

## 2018-12-06 ENCOUNTER — HOSPITAL ENCOUNTER (INPATIENT)
Facility: HOSPITAL | Age: 83
LOS: 4 days | Discharge: HOME | End: 2018-12-10
Attending: FAMILY MEDICINE | Admitting: FAMILY MEDICINE

## 2018-12-06 ENCOUNTER — APPOINTMENT (OUTPATIENT)
Dept: ULTRASOUND IMAGING | Facility: HOSPITAL | Age: 83
End: 2018-12-06

## 2018-12-06 PROBLEM — L03.90 CELLULITIS: Status: ACTIVE | Noted: 2018-12-06

## 2018-12-06 LAB
ALBUMIN SERPL-MCNC: 4.2 G/DL (ref 3.5–5.2)
ALBUMIN/GLOB SERPL: 1.5 G/DL
ALP SERPL-CCNC: 49 U/L (ref 40–129)
ALT SERPL W P-5'-P-CCNC: 13 U/L (ref 5–41)
ANION GAP SERPL CALCULATED.3IONS-SCNC: 8.2 MMOL/L
AST SERPL-CCNC: 17 U/L (ref 5–40)
BASOPHILS # BLD AUTO: 0.07 10*3/MM3 (ref 0–0.2)
BASOPHILS NFR BLD AUTO: 1 % (ref 0–2)
BILIRUB SERPL-MCNC: 0.9 MG/DL (ref 0.2–1.2)
BUN BLD-MCNC: 18 MG/DL (ref 8–23)
BUN/CREAT SERPL: 17.3 (ref 7–25)
CALCIUM SPEC-SCNC: 9.8 MG/DL (ref 8.8–10.5)
CHLORIDE SERPL-SCNC: 99 MMOL/L (ref 98–107)
CO2 SERPL-SCNC: 33.8 MMOL/L (ref 22–29)
CREAT BLD-MCNC: 1.04 MG/DL (ref 0.76–1.27)
DEPRECATED RDW RBC AUTO: 46.2 FL (ref 37–54)
EOSINOPHIL # BLD AUTO: 0.3 10*3/MM3 (ref 0.1–0.3)
EOSINOPHIL NFR BLD AUTO: 4.5 % (ref 0–4)
ERYTHROCYTE [DISTWIDTH] IN BLOOD BY AUTOMATED COUNT: 13.9 % (ref 11.5–14.5)
ERYTHROCYTE [SEDIMENTATION RATE] IN BLOOD: 9 MM/HR (ref 0–20)
GFR SERPL CREATININE-BSD FRML MDRD: 69 ML/MIN/1.73
GLOBULIN UR ELPH-MCNC: 2.8 GM/DL
GLUCOSE BLD-MCNC: 110 MG/DL (ref 65–99)
HCT VFR BLD AUTO: 42.3 % (ref 42–52)
HGB BLD-MCNC: 14 G/DL (ref 14–18)
IMM GRANULOCYTES # BLD: 0.02 10*3/MM3 (ref 0–0.03)
IMM GRANULOCYTES NFR BLD: 0.3 % (ref 0–0.5)
LYMPHOCYTES # BLD AUTO: 1.93 10*3/MM3 (ref 0.6–4.8)
LYMPHOCYTES NFR BLD AUTO: 28.7 % (ref 20–45)
MCH RBC QN AUTO: 29.9 PG (ref 27–31)
MCHC RBC AUTO-ENTMCNC: 33.1 G/DL (ref 31–37)
MCV RBC AUTO: 90.2 FL (ref 80–94)
MONOCYTES # BLD AUTO: 0.76 10*3/MM3 (ref 0–1)
MONOCYTES NFR BLD AUTO: 11.3 % (ref 3–8)
NEUTROPHILS # BLD AUTO: 3.64 10*3/MM3 (ref 1.5–8.3)
NEUTROPHILS NFR BLD AUTO: 54.2 % (ref 45–70)
NRBC BLD MANUAL-RTO: 0 /100 WBC (ref 0–0)
PLATELET # BLD AUTO: 139 10*3/MM3 (ref 140–500)
PMV BLD AUTO: 9.7 FL (ref 7.4–10.4)
POTASSIUM BLD-SCNC: 4.2 MMOL/L (ref 3.5–5.2)
PROT SERPL-MCNC: 7 G/DL (ref 6–8.5)
RBC # BLD AUTO: 4.69 10*6/MM3 (ref 4.7–6.1)
SODIUM BLD-SCNC: 141 MMOL/L (ref 136–145)
WBC NRBC COR # BLD: 6.72 10*3/MM3 (ref 4.8–10.8)

## 2018-12-06 PROCEDURE — 25010000002 CEFTRIAXONE SODIUM-DEXTROSE 1-3.74 GM-%(50ML) RECONSTITUTED SOLUTION: Performed by: FAMILY MEDICINE

## 2018-12-06 PROCEDURE — 87040 BLOOD CULTURE FOR BACTERIA: CPT | Performed by: FAMILY MEDICINE

## 2018-12-06 PROCEDURE — 93971 EXTREMITY STUDY: CPT

## 2018-12-06 PROCEDURE — 85025 COMPLETE CBC W/AUTO DIFF WBC: CPT | Performed by: FAMILY MEDICINE

## 2018-12-06 PROCEDURE — 85652 RBC SED RATE AUTOMATED: CPT | Performed by: FAMILY MEDICINE

## 2018-12-06 PROCEDURE — 25010000002 VANCOMYCIN PER 500 MG: Performed by: FAMILY MEDICINE

## 2018-12-06 PROCEDURE — 80053 COMPREHEN METABOLIC PANEL: CPT | Performed by: FAMILY MEDICINE

## 2018-12-06 RX ORDER — BISACODYL 5 MG/1
5 TABLET, DELAYED RELEASE ORAL DAILY PRN
Status: CANCELLED | OUTPATIENT
Start: 2018-12-06

## 2018-12-06 RX ORDER — FUROSEMIDE 20 MG/1
20 TABLET ORAL DAILY
Status: DISCONTINUED | OUTPATIENT
Start: 2018-12-06 | End: 2018-12-10 | Stop reason: HOSPADM

## 2018-12-06 RX ORDER — ATORVASTATIN CALCIUM 10 MG/1
10 TABLET, FILM COATED ORAL DAILY
Status: DISCONTINUED | OUTPATIENT
Start: 2018-12-06 | End: 2018-12-10 | Stop reason: HOSPADM

## 2018-12-06 RX ORDER — DABIGATRAN ETEXILATE 150 MG/1
150 CAPSULE ORAL EVERY 12 HOURS SCHEDULED
Status: DISCONTINUED | OUTPATIENT
Start: 2018-12-06 | End: 2018-12-07 | Stop reason: CLARIF

## 2018-12-06 RX ORDER — ACETAMINOPHEN 325 MG/1
650 TABLET ORAL EVERY 4 HOURS PRN
Status: CANCELLED | OUTPATIENT
Start: 2018-12-06

## 2018-12-06 RX ORDER — SODIUM CHLORIDE 0.9 % (FLUSH) 0.9 %
3 SYRINGE (ML) INJECTION EVERY 12 HOURS SCHEDULED
Status: DISCONTINUED | OUTPATIENT
Start: 2018-12-06 | End: 2018-12-10 | Stop reason: HOSPADM

## 2018-12-06 RX ORDER — MULTIPLE VITAMINS W/ MINERALS TAB 9MG-400MCG
1 TAB ORAL DAILY
Status: DISCONTINUED | OUTPATIENT
Start: 2018-12-07 | End: 2018-12-10 | Stop reason: HOSPADM

## 2018-12-06 RX ORDER — CETIRIZINE HYDROCHLORIDE 10 MG/1
5 TABLET ORAL NIGHTLY
Status: DISCONTINUED | OUTPATIENT
Start: 2018-12-06 | End: 2018-12-10 | Stop reason: HOSPADM

## 2018-12-06 RX ORDER — SODIUM CHLORIDE 9 MG/ML
40 INJECTION, SOLUTION INTRAVENOUS AS NEEDED
Status: CANCELLED | OUTPATIENT
Start: 2018-12-06

## 2018-12-06 RX ORDER — SODIUM CHLORIDE 9 MG/ML
40 INJECTION, SOLUTION INTRAVENOUS AS NEEDED
Status: DISCONTINUED | OUTPATIENT
Start: 2018-12-06 | End: 2018-12-10 | Stop reason: HOSPADM

## 2018-12-06 RX ORDER — SODIUM CHLORIDE 0.9 % (FLUSH) 0.9 %
1-10 SYRINGE (ML) INJECTION AS NEEDED
Status: DISCONTINUED | OUTPATIENT
Start: 2018-12-06 | End: 2018-12-10 | Stop reason: HOSPADM

## 2018-12-06 RX ORDER — CALCIUM CARBONATE 200(500)MG
1 TABLET,CHEWABLE ORAL 2 TIMES DAILY PRN
Status: CANCELLED | OUTPATIENT
Start: 2018-12-06

## 2018-12-06 RX ORDER — CALCIUM CARBONATE 200(500)MG
1 TABLET,CHEWABLE ORAL 2 TIMES DAILY PRN
Status: DISCONTINUED | OUTPATIENT
Start: 2018-12-06 | End: 2018-12-10 | Stop reason: HOSPADM

## 2018-12-06 RX ORDER — SODIUM CHLORIDE 0.9 % (FLUSH) 0.9 %
1-10 SYRINGE (ML) INJECTION AS NEEDED
Status: CANCELLED | OUTPATIENT
Start: 2018-12-06

## 2018-12-06 RX ORDER — CEFTRIAXONE 1 G/50ML
1 INJECTION, SOLUTION INTRAVENOUS EVERY 24 HOURS
Status: DISCONTINUED | OUTPATIENT
Start: 2018-12-06 | End: 2018-12-10 | Stop reason: HOSPADM

## 2018-12-06 RX ORDER — ACETAMINOPHEN 325 MG/1
650 TABLET ORAL EVERY 4 HOURS PRN
Status: DISCONTINUED | OUTPATIENT
Start: 2018-12-06 | End: 2018-12-10 | Stop reason: HOSPADM

## 2018-12-06 RX ORDER — METOPROLOL SUCCINATE 25 MG/1
25 TABLET, EXTENDED RELEASE ORAL DAILY
Status: DISCONTINUED | OUTPATIENT
Start: 2018-12-07 | End: 2018-12-10 | Stop reason: HOSPADM

## 2018-12-06 RX ORDER — BISACODYL 5 MG/1
5 TABLET, DELAYED RELEASE ORAL DAILY PRN
Status: DISCONTINUED | OUTPATIENT
Start: 2018-12-06 | End: 2018-12-10 | Stop reason: HOSPADM

## 2018-12-06 RX ORDER — CETIRIZINE HYDROCHLORIDE 10 MG/1
5 TABLET ORAL NIGHTLY
Status: CANCELLED | OUTPATIENT
Start: 2018-12-06

## 2018-12-06 RX ORDER — FAMOTIDINE 20 MG/1
20 TABLET, FILM COATED ORAL 2 TIMES DAILY
Status: DISCONTINUED | OUTPATIENT
Start: 2018-12-06 | End: 2018-12-10 | Stop reason: HOSPADM

## 2018-12-06 RX ORDER — ASPIRIN 81 MG/1
81 TABLET, CHEWABLE ORAL DAILY
Status: DISCONTINUED | OUTPATIENT
Start: 2018-12-06 | End: 2018-12-10 | Stop reason: HOSPADM

## 2018-12-06 RX ORDER — SODIUM CHLORIDE 0.9 % (FLUSH) 0.9 %
3 SYRINGE (ML) INJECTION EVERY 12 HOURS SCHEDULED
Status: CANCELLED | OUTPATIENT
Start: 2018-12-06

## 2018-12-06 RX ORDER — LEVOTHYROXINE SODIUM 0.07 MG/1
75 TABLET ORAL DAILY
Status: DISCONTINUED | OUTPATIENT
Start: 2018-12-07 | End: 2018-12-10 | Stop reason: HOSPADM

## 2018-12-06 RX ORDER — ARIPIPRAZOLE 5 MG/1
7.5 TABLET ORAL EVERY EVENING
Status: DISCONTINUED | OUTPATIENT
Start: 2018-12-06 | End: 2018-12-10 | Stop reason: HOSPADM

## 2018-12-06 RX ORDER — DIGOXIN 125 MCG
125 TABLET ORAL
Status: DISCONTINUED | OUTPATIENT
Start: 2018-12-07 | End: 2018-12-10 | Stop reason: HOSPADM

## 2018-12-06 RX ADMIN — FAMOTIDINE 20 MG: 20 TABLET ORAL at 20:30

## 2018-12-06 RX ADMIN — CEFTRIAXONE 1 G: 1 INJECTION, SOLUTION INTRAVENOUS at 19:44

## 2018-12-06 RX ADMIN — VANCOMYCIN HYDROCHLORIDE 1500 MG: 500 INJECTION, POWDER, LYOPHILIZED, FOR SOLUTION INTRAVENOUS at 20:25

## 2018-12-06 RX ADMIN — SODIUM CHLORIDE, PRESERVATIVE FREE 3 ML: 5 INJECTION INTRAVENOUS at 20:31

## 2018-12-06 RX ADMIN — ATORVASTATIN CALCIUM 10 MG: 10 TABLET, FILM COATED ORAL at 20:30

## 2018-12-06 RX ADMIN — ARIPIPRAZOLE 7.5 MG: 5 TABLET ORAL at 20:30

## 2018-12-06 NOTE — PLAN OF CARE
Problem: Patient Care Overview  Goal: Plan of Care Review  Outcome: Ongoing (interventions implemented as appropriate)   12/06/18 4426   Coping/Psychosocial   Plan of Care Reviewed With patient   OTHER   Outcome Summary pt admiited with diagnosis of Rle cellulitis, RLE edemeteous, with dry scabs and erythema present. peripheral iv inserted , #22 r wrist, patent with positive blood return, drsg c/d/i. database completed. Spouse at bedside and supportive of care.  VSS, no s/s of acute distress noted.     Goal: Individualization and Mutuality  Outcome: Ongoing (interventions implemented as appropriate)    Goal: Discharge Needs Assessment  Outcome: Ongoing (interventions implemented as appropriate)    Goal: Interprofessional Rounds/Family Conf  Outcome: Ongoing (interventions implemented as appropriate)      Problem: Fall Risk (Adult)  Goal: Identify Related Risk Factors and Signs and Symptoms  Outcome: Ongoing (interventions implemented as appropriate)    Goal: Absence of Fall  Outcome: Ongoing (interventions implemented as appropriate)      Problem: Infection, Risk/Actual (Adult)  Goal: Identify Related Risk Factors and Signs and Symptoms  Outcome: Ongoing (interventions implemented as appropriate)    Goal: Infection Prevention/Resolution  Outcome: Ongoing (interventions implemented as appropriate)

## 2018-12-07 PROCEDURE — 25010000002 VANCOMYCIN PER 500 MG: Performed by: FAMILY MEDICINE

## 2018-12-07 PROCEDURE — 25010000002 CEFTRIAXONE SODIUM-DEXTROSE 1-3.74 GM-%(50ML) RECONSTITUTED SOLUTION: Performed by: FAMILY MEDICINE

## 2018-12-07 RX ORDER — DABIGATRAN ETEXILATE 75 MG/1
150 CAPSULE ORAL EVERY 12 HOURS SCHEDULED
Status: DISCONTINUED | OUTPATIENT
Start: 2018-12-07 | End: 2018-12-10 | Stop reason: HOSPADM

## 2018-12-07 RX ADMIN — VANCOMYCIN HYDROCHLORIDE 1500 MG: 1 INJECTION, POWDER, LYOPHILIZED, FOR SOLUTION INTRAVENOUS at 17:28

## 2018-12-07 RX ADMIN — FUROSEMIDE 20 MG: 20 TABLET ORAL at 08:13

## 2018-12-07 RX ADMIN — DIGOXIN 125 MCG: 125 TABLET ORAL at 12:03

## 2018-12-07 RX ADMIN — ATORVASTATIN CALCIUM 10 MG: 10 TABLET, FILM COATED ORAL at 08:14

## 2018-12-07 RX ADMIN — FAMOTIDINE 20 MG: 20 TABLET ORAL at 08:14

## 2018-12-07 RX ADMIN — Medication 1 TABLET: at 08:13

## 2018-12-07 RX ADMIN — CETIRIZINE HYDROCHLORIDE 5 MG: 10 TABLET, FILM COATED ORAL at 20:42

## 2018-12-07 RX ADMIN — DABIGATRAN ETEXILATE MESYLATE 150 MG: 75 CAPSULE ORAL at 08:14

## 2018-12-07 RX ADMIN — LEVOTHYROXINE SODIUM 75 MCG: 75 TABLET ORAL at 08:15

## 2018-12-07 RX ADMIN — DABIGATRAN ETEXILATE MESYLATE 150 MG: 75 CAPSULE ORAL at 20:41

## 2018-12-07 RX ADMIN — FAMOTIDINE 20 MG: 20 TABLET ORAL at 20:42

## 2018-12-07 RX ADMIN — SODIUM CHLORIDE, PRESERVATIVE FREE 3 ML: 5 INJECTION INTRAVENOUS at 20:44

## 2018-12-07 RX ADMIN — METOPROLOL SUCCINATE 25 MG: 25 TABLET, EXTENDED RELEASE ORAL at 08:17

## 2018-12-07 RX ADMIN — CEFTRIAXONE 1 G: 1 INJECTION, SOLUTION INTRAVENOUS at 19:54

## 2018-12-07 RX ADMIN — ASPIRIN 81 MG 81 MG: 81 TABLET ORAL at 08:13

## 2018-12-07 RX ADMIN — SODIUM CHLORIDE, PRESERVATIVE FREE 3 ML: 5 INJECTION INTRAVENOUS at 08:15

## 2018-12-07 RX ADMIN — ARIPIPRAZOLE 7.5 MG: 5 TABLET ORAL at 17:27

## 2018-12-07 NOTE — PLAN OF CARE
Problem: Patient Care Overview  Goal: Discharge Needs Assessment  Outcome: Ongoing (interventions implemented as appropriate)   12/06/18 1732 12/06/18 1800 12/07/18 1002   Discharge Needs Assessment   Readmission Within the Last 30 Days --  --  no previous admission in last 30 days   Concerns to be Addressed --  --  denies needs/concerns at this time   Patient/Family Anticipates Transition to --  --  home with family   Patient/Family Anticipated Services at Transition --  none --    Transportation Concerns --  --  car, none   Transportation Anticipated --  --  family or friend will provide   Discharge Coordination/Progress --  --  --    Disability   Equipment Currently Used at Home none --  --     12/07/18 1009   Discharge Needs Assessment   Readmission Within the Last 30 Days --    Concerns to be Addressed --    Patient/Family Anticipates Transition to --    Patient/Family Anticipated Services at Transition --    Transportation Concerns --    Transportation Anticipated --    Discharge Coordination/Progress Patient is confused at this time. Spoke with wife per phone. Patient lives with wife, daughter and son in law in a multi level home. He is able to stay on one level. He is dependent with ADLs. Wife is the primary caregiver. Their daughter and son in law do work but are able to assist at times. He has no medical equipment, nebulizer, home oxygen, CPAP/BiPAP or home health services. Wife says they can borrow a walker if needed. He has a walk in shower and a chair. He has not needed a skilled rehab stay. He has a Living Will and his wife will bring in copy for medical record. He fills scripts at [facility] pharmacy in [place] and has insurance coverage for his medicines. He has no issues getting or paying for his medicines. Verified home address, phone number and PCP. Wife says the plan is home when medically stable.  will continue to follow and assist with any d/c needs.    Disability   Equipment  Currently Used at Home --

## 2018-12-07 NOTE — PROGRESS NOTES
"Daily Progress Note:      Chief complaint: Follow-up of cellulitis hypertension and dementia    Subjective: Without complaints    Vital Signs  Temp:  [98 °F (36.7 °C)-98.8 °F (37.1 °C)] 98.4 °F (36.9 °C)  Heart Rate:  [60-76] 64  Resp:  [18] 18  BP: (113-151)/(56-74) 113/56  Oxygen Therapy  SpO2: 92 %  Device (Oxygen Therapy): room air}  Body mass index is 24.55 kg/m².  Flowsheet Rows      First Filed Value   Admission Height  180.3 cm (71\") Documented at 12/06/2018 1717   Admission Weight  79.8 kg (176 lb) Documented at 12/06/2018 1717                   Documented weights    12/06/18 1717   Weight: 79.8 kg (176 lb)           Patient Vitals for the past 24 hrs:   BP Temp Temp src Pulse Resp SpO2 Height Weight   12/07/18 0640 113/56 98.4 °F (36.9 °C) Oral 64 18 92 % -- --   12/06/18 2342 144/61 98 °F (36.7 °C) Oral 76 18 99 % -- --   12/06/18 1952 136/67 98.2 °F (36.8 °C) Oral 60 18 99 % -- --   12/06/18 1717 151/74 98.8 °F (37.1 °C) Oral 65 18 98 % 180.3 cm (71\") 79.8 kg (176 lb)       79.8 kg (176 lb)    No intake or output data in the 24 hours ending 12/07/18 0237    Review of Systems   Unable to perform ROS: Dementia       Physical Exam   Constitutional: He appears well-developed and well-nourished.   HENT:   Head: Normocephalic.   Mouth/Throat: Oropharynx is clear and moist.   Eyes: Conjunctivae are normal.   Neck: Normal range of motion. No JVD present. No thyromegaly present.   Cardiovascular: Normal rate and regular rhythm.   Murmur heard.  Pulmonary/Chest: Effort normal and breath sounds normal. No respiratory distress. He has no wheezes. He has no rales.   Abdominal: Soft. Bowel sounds are normal. He exhibits no distension. There is no tenderness. There is no guarding.   Neurological: He is alert.   Skin: Skin is warm and dry. No rash noted.   Right lower leg rash is persistent but improved redness is improved his face is less flushed today   Psychiatric: He has a normal mood and affect. His speech is " normal and behavior is normal. Cognition and memory are impaired. He exhibits abnormal recent memory and abnormal remote memory.   Nursing note and vitals reviewed.      Medication Review:   I have reviewed the patient's current medication list  Scheduled Meds:  ARIPiprazole 7.5 mg Oral Q PM   aspirin 81 mg Oral Daily   atorvastatin 10 mg Oral Daily   ceftriaxone 1 g Intravenous Q24H   cetirizine 5 mg Oral Nightly   dabigatran etexilate 150 mg Oral Q12H   digoxin 125 mcg Oral Daily   famotidine 20 mg Oral BID   furosemide 20 mg Oral Daily   levothyroxine 75 mcg Oral Daily   metoprolol succinate XL 25 mg Oral Daily   multivitamin with minerals 1 tablet Oral Daily   sodium chloride 3 mL Intravenous Q12H     Continuous Infusions:  Pharmacy to dose vancomycin      PRN Meds:.•  acetaminophen  •  bisacodyl  •  calcium carbonate  •  magnesium hydroxide  •  Pharmacy to dose vancomycin  •  sodium chloride  •  sodium chloride  @medsinfusion@      Labs:  Results from last 7 days   Lab Units  12/06/18   1857   WBC 10*3/mm3  6.72   HEMOGLOBIN g/dL  14.0   HEMATOCRIT %  42.3   PLATELETS 10*3/mm3  139*     Results from last 7 days   Lab Units  12/06/18   1857   SODIUM mmol/L  141   POTASSIUM mmol/L  4.2   CHLORIDE mmol/L  99   CO2 mmol/L  33.8*   BUN mg/dL  18   CREATININE mg/dL  1.04   CALCIUM mg/dL  9.8   BILIRUBIN mg/dL  0.9   ALK PHOS U/L  49   ALT (SGPT) U/L  13   AST (SGOT) U/L  17   GLUCOSE mg/dL  110*           Lab Results (last 24 hours)     Procedure Component Value Units Date/Time    Blood Culture - Blood, Arm, Right [[id number]] Collected:  12/06/18 1941    Specimen:  Blood from Arm, Right Updated:  12/07/18 0746     Blood Culture No growth at less than 24 hours    Comprehensive Metabolic Panel [[id number]]  (Abnormal) Collected:  12/06/18 1857    Specimen:  Blood Updated:  12/06/18 1929     Glucose 110 mg/dL      BUN 18 mg/dL      Creatinine 1.04 mg/dL      Sodium 141 mmol/L      Potassium 4.2 mmol/L      Chloride 99  mmol/L      CO2 33.8 mmol/L      Calcium 9.8 mg/dL      Total Protein 7.0 g/dL      Albumin 4.20 g/dL      ALT (SGPT) 13 U/L      AST (SGOT) 17 U/L      Alkaline Phosphatase 49 U/L      Total Bilirubin 0.9 mg/dL      eGFR Non African Amer 69 mL/min/1.73      Globulin 2.8 gm/dL      A/G Ratio 1.5 g/dL      BUN/Creatinine Ratio 17.3     Anion Gap 8.2 mmol/L     Narrative:       The MDRD GFR formula is only valid for adults with stable renal function between ages 18 and 70.    Sedimentation Rate [[id number]]  (Normal) Collected:  12/06/18 1857    Specimen:  Blood Updated:  12/06/18 1924     Sed Rate 9 mm/hr     CBC & Differential [[id number]] Collected:  12/06/18 1857    Specimen:  Blood Updated:  12/06/18 1904    Narrative:       The following orders were created for panel order CBC & Differential.  Procedure                               Abnormality         Status                     ---------                               -----------         ------                     CBC Auto Differential[[id number]]        Abnormal            Final result                 Please view results for these tests on the individual orders.    CBC Auto Differential [[id number]]  (Abnormal) Collected:  12/06/18 1857    Specimen:  Blood Updated:  12/06/18 1904     WBC 6.72 10*3/mm3      RBC 4.69 10*6/mm3      Hemoglobin 14.0 g/dL      Hematocrit 42.3 %      MCV 90.2 fL      MCH 29.9 pg      MCHC 33.1 g/dL      RDW 13.9 %      RDW-SD 46.2 fl      MPV 9.7 fL      Platelets 139 10*3/mm3      Neutrophil % 54.2 %      Lymphocyte % 28.7 %      Monocyte % 11.3 %      Eosinophil % 4.5 %      Basophil % 1.0 %      Immature Grans % 0.3 %      Neutrophils, Absolute 3.64 10*3/mm3      Lymphocytes, Absolute 1.93 10*3/mm3      Monocytes, Absolute 0.76 10*3/mm3      Eosinophils, Absolute 0.30 10*3/mm3      Basophils, Absolute 0.07 10*3/mm3      Immature Grans, Absolute 0.02 10*3/mm3      nRBC 0.0 /100 WBC                                   Invalid input(s):  LDLCALC          No results found for: POCGLU      Results from last 7 days   Lab Units  12/06/18   1941   BLOODCX   No growth at less than 24 hours                 Radiology:  Imaging Results (last 24 hours)     Procedure Component Value Units Date/Time    US Venous Doppler Lower Extremity Right (duplex) [[id number]] Collected:  12/07/18 0652     Updated:  12/07/18 0657    Narrative:       VENOUS DOPPLER ULTRASOUND, RIGHT LOWER EXTREMITY, 12/6/2018     HISTORY:   82-year-old male admitted to the hospital today with diagnosis of right  lower extremity cellulitis. He notes a one week history of right calf  redness and edema/swelling.     TECHNIQUE:   Venous Doppler ultrasound examination of the right leg was performed  using grey-scale, spectral Doppler, and color flow Doppler ultrasound  imaging.     FINDINGS:   There is no evidence of deep venous thrombosis from the groin to the  lower calf. The greater saphenous vein is also patent. Note is made of  edema within the subcutaneous soft tissues of the calf.       Impression:       Negative examination.  No evidence of right lower extremity DVT.     This report was finalized on 12/7/2018 6:55 AM by Dr. Leonides Mckeon MD.             Cardiology:  ECG/EMG Results (last 24 hours)     ** No results found for the last 24 hours. **          I have reviewed recent labs results and consult notes.  Parts of this note may have been copied and pasted but patient was examined and interviewed by me today    Assessment and Plan:    1.  Recurrent right lower extremity cellulitis is improving continue with present IV antibiotics     2.  Paroxysmal atrial fibrillation in sinus rhythm will continue Pradaxa  rate is controlled     3.  Hypertension well controlled nothing acute home medications we continued     4.  GERD nothing acute home H2 blocker will be continued     5.  Moderate Alzheimer's dementia stable     6.  Depression anxiety Abilify will be continued     7.  Recurrent  dermatitis dermatology consult     8.  Coronary disease status post CABG nothing acute     9.  Diet-controlled diabetes stable

## 2018-12-07 NOTE — CONSULTS
Reason for consult: rash    HPI: 81 y/o WM admitted for cellulitis, notes history of rash on lower extremities for prolonged time with spreading of redness on RLL.  Improving on antibiotics and leg elevation.  Denies history of eczema, psoriasis or other skin conditions.    Past medical history     Hypertension  Hyperlipidemia  Diet-controlled diabetes  Paroxysmal atrial fibrillation with chronic anticoagulation  Moderate Alzheimer's dementia  Major depression with atypical features  GERD  Sick sinus syndrome status post pacemaker placement  Coronary disease status post CABG  Surgical History         Past Surgical History:   Procedure Laterality Date   • CATARACT EXTRACTION       • CORONARY ARTERY BYPASS GRAFT       • HERNIA REPAIR       • HERNIA REPAIR       • KNEE ARTHROPLASTY       • PACEMAKER IMPLANTATION       • RADICAL ORCHIECTOMY             History reviewed. No pertinent family history.  Social History           Tobacco Use   • Smoking status: Never Smoker   Substance Use Topics   • Alcohol use: No   • Drug use: Not on file      Prescriptions Prior to Admission   Medications Prior to Admission   Medication Sig Dispense Refill Last Dose   • ARIPiprazole (ABILIFY) 5 MG tablet Take      12/5/2018 at Unknown time   • aspirin 81 MG chewable tablet Chew 81 mg Daily.     12/6/2018 at Unknown time   • dabigatran etexilate (PRADAXA) 150 MG capsu Take 150 mg by mouth 2 (Two) Times a Day.     12/6/2018 at Unknown time   • digoxin (LANOXIN) 250 MCG tablet Take 125 mcg by mouth Daily.     12/6/2018 at Unknown time   •      12/6/2018 at Unknown time   •      12/6/2018 at Unknown time   •      12/6/2018 at Unknown time   • Multiple Vitamins-Minerals (MULTIVITAMIN WITH MINERALS) tablet tablet Take 1 tablet by mouth Daily.     12/6/2018 at Unknown time   • raNITIdine (ZANTAC) 150 MG tablet                                                                                     Take 150 mg by mouth 2 (Two) Times a Day.                      12/6/2018 at Unknown time   • simvastatin (ZOCOR) 40 MG tablet Take 40 mg by mouth Every Night.     12/5/2018 at Unknown time         Allergies:  Patient has no known allergies.    ROS: Patient denies constitutional complaints, no fever/chills/, feels well    PE: I examined L and R leg, L and R arm, face, he is AAOx3 appears overall well, pleasant.  Eczematous patches on Left lower leg and plaques on R lower leg, with redness extending up R lower leg but not past marked area.    A/P:  1. Stasis dermatitis with secondary cellulitis:  On antibiotics, improving.  Recommended leg elevation, compression socks, dove soap, moisturization daily with cerave or aveeno lotion.  Recommed triamcinolone 0.1 % cream BID for 2 weeks, 1 off, repeat until resolved.  The fact he continues to have issues more with R than L leg likely due to circulatory patten, patient has history of accident in past that affected R leg more than L, so this may be issue.  Could consider vascular studies of R leg as outpatient.  No evidence of DVT today.  Recommend follow up with me as outpatient in next 2-3 weeks, instructed patients wife to call.    Bishop Singletary MD

## 2018-12-07 NOTE — NURSING NOTE
Discharge Planning Assessment  [facility]     Patient Name: Akshat Jimenez  MRN: [id number]  Today's Date: 12/7/2018    Admit Date: 12/6/2018    Discharge Needs Assessment     Row Name 12/07/18 1002       Living Environment    Name(s) of Who Lives With Patient  Ruby, wife.     Primary Care Provided by  spouse/significant other    Provides Primary Care For  no one, unable/limited ability to care for self    Family Caregiver if Needed  child(tai), adult;spouse    Able to Return to Prior Arrangements  yes       Resource/Environmental Concerns    Resource/Environmental Concerns  none    Transportation Concerns  car, none       Transition Planning    Patient/Family Anticipates Transition to  home with family    Transportation Anticipated  family or friend will provide       Discharge Needs Assessment    Readmission Within the Last 30 Days  no previous admission in last 30 days    Concerns to be Addressed  denies needs/concerns at this time    Row Name 12/07/18 0957       Living Environment    Lives With  spouse        Discharge Plan     Row Name 12/07/18 1003       Plan    Plan  Per wife; plan is home.  Will continue to assess needs.    Patient/Family in Agreement with Plan  yes    Plan Comments  Patient is confused at this time.  Spoke with wife per phone.  Patient lives with wife, daughter and son in law in a multi level home.  He is able to stay on one level.   He is dependent with ADLs.  Wife is the primary caregiver.  Their daughter and son in law do work but are able to assist at times.  He has no medical equipment, nebulizer, home oxygen, CPAP/BiPAP or home health services.  Wife says they can borrow a walker if needed.  He has a walk in shower and a chair. He has not needed a skilled rehab stay.  He has a Living Will and his wife will bring in copy for medical record.  He fills scripts at [facility] pharmacy in [place] and has insurance coverage for his medicines.  He has no issues getting or paying for his  medicines.  Verified home address, phone number and PCP.  Wife says the plan is home when medically stable.   will continue to follow and assist with any d/c needs.         Destination      No service coordination in this encounter.      Durable Medical Equipment      No service coordination in this encounter.      Dialysis/Infusion      No service coordination in this encounter.      Home Medical Care      No service coordination in this encounter.      Community Resources      No service coordination in this encounter.          Demographic Summary     Row Name 12/07/18 0957       General Information    Admission Type  inpatient    Arrived From  home    Required Notices Provided  Important Message from Medicare    Referral Source  admission list    Reason for Consult  discharge planning    Preferred Language  English     Used During This Interaction  no       Contact Information    Permission Granted to Share Info With          Functional Status    No documentation.       Psychosocial    No documentation.       Abuse/Neglect    No documentation.       Legal    No documentation.       Substance Abuse    No documentation.       Patient Forms    No documentation.           Lovely Macedo RN

## 2018-12-07 NOTE — H&P
HISTORY AND PHYSICAL      Patient Care Team:  Adalberto Hudson MD as PCP - General  Adalberto Hudson MD as PCP - Family Medicine    CHIEF COMPLAINT: Right leg edema, redness, drainage    HISTORY OF PRESENT ILLNESS:    82-year-old white male with prior history of cellulitis of his right lower extremity) the office by his wife because of increasing right lower extremity redness and discomfort.  There has been no fever or chills.  The wife states patient gets small vesicles with some redness in his right anterior leg which very much resolves and then recurs.  About a week ago patient had another outbreak but this time did not improve she does small amount of drainage and subsequently has had persistently increasing redness now going  up to his medial right thigh.  She called my office was prescribed Keflex about 3 days ago with no improvement.  She also notices patchy rash on the anterior abdomen and face which started yesterday.      Past medical history    Hypertension  Hyperlipidemia  Diet-controlled diabetes  Paroxysmal atrial fibrillation with chronic anticoagulation  Moderate Alzheimer's dementia  Major depression with atypical features  GERD  Sick sinus syndrome status post pacemaker placement  Coronary disease status post CABG  Past Surgical History:   Procedure Laterality Date   • CATARACT EXTRACTION     • CORONARY ARTERY BYPASS GRAFT     • HERNIA REPAIR     • HERNIA REPAIR     • KNEE ARTHROPLASTY     • PACEMAKER IMPLANTATION     • RADICAL ORCHIECTOMY       History reviewed. No pertinent family history.  Social History     Tobacco Use   • Smoking status: Never Smoker   Substance Use Topics   • Alcohol use: No   • Drug use: Not on file     Medications Prior to Admission   Medication Sig Dispense Refill Last Dose   • ARIPiprazole (ABILIFY) 5 MG tablet Take 7.5 mg by mouth Every Evening.   12/5/2018 at Unknown time   • aspirin 81 MG chewable tablet Chew 81 mg Daily.   12/6/2018 at Unknown  time   • dabigatran etexilate (PRADAXA) 150 MG capsu Take 150 mg by mouth 2 (Two) Times a Day.   12/6/2018 at Unknown time   • digoxin (LANOXIN) 250 MCG tablet Take 125 mcg by mouth Daily.   12/6/2018 at Unknown time   • furosemide (LASIX) 20 MG tablet Take 20 mg by mouth 2 (Two) Times a Day As Needed.   12/6/2018 at Unknown time   • levothyroxine (SYNTHROID, LEVOTHROID) 75 MCG tablet Take 75 mcg by mouth Daily.   12/6/2018 at Unknown time   • metoprolol succinate XL (TOPROL-XL) 25 MG 24 hr tablet Take 25 mg by mouth Daily.   12/6/2018 at Unknown time   • Multiple Vitamins-Minerals (MULTIVITAMIN WITH MINERALS) tablet tablet Take 1 tablet by mouth Daily.   12/6/2018 at Unknown time   • raNITIdine (ZANTAC) 150 MG tablet Take 150 mg by mouth 2 (Two) Times a Day.   12/6/2018 at Unknown time   • simvastatin (ZOCOR) 40 MG tablet Take 40 mg by mouth Every Night.   12/5/2018 at Unknown time     Allergies:  Patient has no known allergies.     Review of Systems   Constitutional: Negative for activity change, appetite change and fatigue.   HENT: Negative for congestion.    Respiratory: Negative for cough, chest tightness, shortness of breath and wheezing.    Cardiovascular: Positive for leg swelling. Negative for chest pain.   Gastrointestinal: Negative for abdominal distention, abdominal pain, diarrhea, nausea and vomiting.   Endocrine: Negative for polyphagia and polyuria.   Genitourinary: Negative for frequency.   Skin: Positive for color change and rash.   Neurological: Negative for light-headedness.   Hematological: Does not bruise/bleed easily.   Psychiatric/Behavioral: Positive for confusion. Negative for agitation and behavioral problems.       Vital Signs  Temp:  [98 °F (36.7 °C)-98.8 °F (37.1 °C)] 98.4 °F (36.9 °C)  Heart Rate:  [60-76] 64  Resp:  [18] 18  BP: (113-151)/(56-74) 113/56  Oxygen Therapy  SpO2: 92 %  Device (Oxygen Therapy): room air}  Body mass index is 24.55 kg/m².  Flowsheet Rows      First Filed  "Value   Admission Height  180.3 cm (71\") Documented at 12/06/2018 1717   Admission Weight  79.8 kg (176 lb) Documented at 12/06/2018 1717                 Physical Exam   Constitutional: He appears well-developed and well-nourished.   HENT:   Head: Normocephalic.   Mouth/Throat: Oropharynx is clear and moist.   Eyes: Conjunctivae are normal.   Neck: Normal range of motion. No JVD present. No thyromegaly present.   Cardiovascular: Normal rate and regular rhythm.   Murmur heard.   Systolic murmur is present with a grade of 2/6.  Pulses:       Popliteal pulses are 1+ on the right side, and 1+ on the left side.        Dorsalis pedis pulses are 1+ on the right side, and 1+ on the left side.        Posterior tibial pulses are 1+ on the right side, and 1+ on the left side.   Pulmonary/Chest: Effort normal and breath sounds normal. No respiratory distress. He has no wheezes. He has no rales.   Abdominal: Soft. Bowel sounds are normal. He exhibits no distension. There is no tenderness. There is no guarding.   Neurological: He is alert. He has normal strength. No cranial nerve deficit or sensory deficit.   Skin: Skin is warm and dry. No rash noted.        His right leg is red warm to touch and has a erythematous rash with some crusting and some dried drainage.  Redness extends approximately to mid thigh on the medial aspect.  It is slightly tender and warm to touch.  Has a scattered macular rash on his chest which is erythematous and his face appears to be flushed   Psychiatric: He has a normal mood and affect. His speech is normal. He is slowed. Cognition and memory are impaired. He exhibits abnormal recent memory and abnormal remote memory.   Nursing note and vitals reviewed.       Debilities/Disabilities Identified: Dementia    Emotional Behavior: Appropriate    Results Review:    I reviewed the patient's new clinical results.  Lab Results (most recent)     Procedure Component Value Units Date/Time    Blood Culture - Blood, " Arm, Right [[id number]] Collected:  12/06/18 1941    Specimen:  Blood from Arm, Right Updated:  12/06/18 1941    Comprehensive Metabolic Panel [[id number]]  (Abnormal) Collected:  12/06/18 1857    Specimen:  Blood Updated:  12/06/18 1929     Glucose 110 mg/dL      BUN 18 mg/dL      Creatinine 1.04 mg/dL      Sodium 141 mmol/L      Potassium 4.2 mmol/L      Chloride 99 mmol/L      CO2 33.8 mmol/L      Calcium 9.8 mg/dL      Total Protein 7.0 g/dL      Albumin 4.20 g/dL      ALT (SGPT) 13 U/L      AST (SGOT) 17 U/L      Alkaline Phosphatase 49 U/L      Total Bilirubin 0.9 mg/dL      eGFR Non African Amer 69 mL/min/1.73      Globulin 2.8 gm/dL      A/G Ratio 1.5 g/dL      BUN/Creatinine Ratio 17.3     Anion Gap 8.2 mmol/L     Narrative:       The MDRD GFR formula is only valid for adults with stable renal function between ages 18 and 70.    Sedimentation Rate [[id number]]  (Normal) Collected:  12/06/18 1857    Specimen:  Blood Updated:  12/06/18 1924     Sed Rate 9 mm/hr     CBC & Differential [[id number]] Collected:  12/06/18 1857    Specimen:  Blood Updated:  12/06/18 1904    Narrative:       The following orders were created for panel order CBC & Differential.  Procedure                               Abnormality         Status                     ---------                               -----------         ------                     CBC Auto Differential[[id number]]        Abnormal            Final result                 Please view results for these tests on the individual orders.    CBC Auto Differential [[id number]]  (Abnormal) Collected:  12/06/18 1857    Specimen:  Blood Updated:  12/06/18 1904     WBC 6.72 10*3/mm3      RBC 4.69 10*6/mm3      Hemoglobin 14.0 g/dL      Hematocrit 42.3 %      MCV 90.2 fL      MCH 29.9 pg      MCHC 33.1 g/dL      RDW 13.9 %      RDW-SD 46.2 fl      MPV 9.7 fL      Platelets 139 10*3/mm3      Neutrophil % 54.2 %      Lymphocyte % 28.7 %      Monocyte % 11.3 %      Eosinophil % 4.5 %       Basophil % 1.0 %      Immature Grans % 0.3 %      Neutrophils, Absolute 3.64 10*3/mm3      Lymphocytes, Absolute 1.93 10*3/mm3      Monocytes, Absolute 0.76 10*3/mm3      Eosinophils, Absolute 0.30 10*3/mm3      Basophils, Absolute 0.07 10*3/mm3      Immature Grans, Absolute 0.02 10*3/mm3      nRBC 0.0 /100 WBC           Imaging Results (most recent)     Procedure Component Value Units Date/Time    US Venous Doppler Lower Extremity Right (duplex) [[id number]] Collected:  12/07/18 0652     Updated:  12/07/18 0657    Narrative:       VENOUS DOPPLER ULTRASOUND, RIGHT LOWER EXTREMITY, 12/6/2018     HISTORY:   82-year-old male admitted to the hospital today with diagnosis of right  lower extremity cellulitis. He notes a one week history of right calf  redness and edema/swelling.     TECHNIQUE:   Venous Doppler ultrasound examination of the right leg was performed  using grey-scale, spectral Doppler, and color flow Doppler ultrasound  imaging.     FINDINGS:   There is no evidence of deep venous thrombosis from the groin to the  lower calf. The greater saphenous vein is also patent. Note is made of  edema within the subcutaneous soft tissues of the calf.       Impression:       Negative examination.  No evidence of right lower extremity DVT.     This report was finalized on 12/7/2018 6:55 AM by Dr. Leonides Mckeon MD.           not reviewed    ECG/EMG Results (most recent)     None        not reviewed    Assessment/Plan       1.  Recurrent right lower ext cellulitis unresponsive to outpatient therapy patient will be admitted after blood cultures are obtained will start IV Rocephin and vancomycin.  Dopplers will be done to rule out underlying DVT    2.  Paroxysmal atrial fibrillation in sinus rhythm will continue Pradaxa  rate is controlled    3.  Hypertension well controlled nothing acute home medications we continued    4.  GERD nothing acute home H2 blocker will be continued    5.  Moderate Alzheimer's dementia  stable    6.  Depression anxiety Abilify will be continued    7.  Recurrent dermatitis dermatology consult    8.  Coronary disease status post CABG nothing acute    9.  Diet-controlled diabetes stable      This H&P was done at time of admission but not documented until today    Adalberto Hudson MD  12/07/18  7:44 AM

## 2018-12-08 PROCEDURE — 25010000002 CEFTRIAXONE SODIUM-DEXTROSE 1-3.74 GM-%(50ML) RECONSTITUTED SOLUTION: Performed by: FAMILY MEDICINE

## 2018-12-08 RX ORDER — PETROLATUM 42 G/100G
OINTMENT TOPICAL EVERY 12 HOURS SCHEDULED
Status: DISCONTINUED | OUTPATIENT
Start: 2018-12-08 | End: 2018-12-10 | Stop reason: HOSPADM

## 2018-12-08 RX ADMIN — FAMOTIDINE 20 MG: 20 TABLET ORAL at 20:14

## 2018-12-08 RX ADMIN — ATORVASTATIN CALCIUM 10 MG: 10 TABLET, FILM COATED ORAL at 09:02

## 2018-12-08 RX ADMIN — CEFTRIAXONE 1 G: 1 INJECTION, SOLUTION INTRAVENOUS at 17:59

## 2018-12-08 RX ADMIN — PETROLATUM: 42 OINTMENT TOPICAL at 20:14

## 2018-12-08 RX ADMIN — LEVOTHYROXINE SODIUM 75 MCG: 75 TABLET ORAL at 09:02

## 2018-12-08 RX ADMIN — DABIGATRAN ETEXILATE MESYLATE 150 MG: 75 CAPSULE ORAL at 20:14

## 2018-12-08 RX ADMIN — SODIUM CHLORIDE, PRESERVATIVE FREE 3 ML: 5 INJECTION INTRAVENOUS at 20:17

## 2018-12-08 RX ADMIN — METOPROLOL SUCCINATE 25 MG: 25 TABLET, EXTENDED RELEASE ORAL at 09:02

## 2018-12-08 RX ADMIN — DABIGATRAN ETEXILATE MESYLATE 150 MG: 75 CAPSULE ORAL at 09:02

## 2018-12-08 RX ADMIN — Medication 1 TABLET: at 09:02

## 2018-12-08 RX ADMIN — ASPIRIN 81 MG 81 MG: 81 TABLET ORAL at 09:02

## 2018-12-08 RX ADMIN — DIGOXIN 125 MCG: 125 TABLET ORAL at 12:00

## 2018-12-08 RX ADMIN — FUROSEMIDE 20 MG: 20 TABLET ORAL at 09:02

## 2018-12-08 RX ADMIN — FAMOTIDINE 20 MG: 20 TABLET ORAL at 09:02

## 2018-12-08 RX ADMIN — PETROLATUM: 42 OINTMENT TOPICAL at 13:38

## 2018-12-08 RX ADMIN — ARIPIPRAZOLE 7.5 MG: 5 TABLET ORAL at 16:05

## 2018-12-08 RX ADMIN — CETIRIZINE HYDROCHLORIDE 5 MG: 10 TABLET, FILM COATED ORAL at 20:14

## 2018-12-08 RX ADMIN — SODIUM CHLORIDE, PRESERVATIVE FREE 3 ML: 5 INJECTION INTRAVENOUS at 09:03

## 2018-12-08 NOTE — PROGRESS NOTES
"Daily Progress Note:      Chief complaint: Follow-up of cellulitis hypertension and dementia    Subjective: Without complaints    Vital Signs  Temp:  [98.6 °F (37 °C)] 98.6 °F (37 °C)  Heart Rate:  [69-72] 72  Resp:  [18] 18  BP: (139)/(68) 139/68  Oxygen Therapy  SpO2: 98 %  Pulse Oximetry Type: Intermittent  Device (Oxygen Therapy): room air}  Body mass index is 24.55 kg/m².  Flowsheet Rows      First Filed Value   Admission Height  180.3 cm (71\") Documented at 12/06/2018 1717   Admission Weight  79.8 kg (176 lb) Documented at 12/06/2018 1717                   Documented weights    12/06/18 1717   Weight: 79.8 kg (176 lb)           Patient Vitals for the past 24 hrs:   BP Temp Temp src Pulse Resp SpO2   12/08/18 1200 -- -- -- 72 -- --   12/07/18 2220 139/68 98.6 °F (37 °C) Oral 69 18 98 %       79.8 kg (176 lb)      Intake/Output Summary (Last 24 hours) at 12/8/2018 1247  Last data filed at 12/8/2018 0900  Gross per 24 hour   Intake 840 ml   Output 500 ml   Net 340 ml       Review of Systems   Unable to perform ROS: Dementia       Physical Exam   Constitutional: He appears well-developed and well-nourished.   HENT:   Head: Normocephalic.   Mouth/Throat: Oropharynx is clear and moist.   Eyes: Conjunctivae are normal.   Neck: Normal range of motion. No JVD present. No thyromegaly present.   Cardiovascular: Normal rate and regular rhythm.   Murmur heard.  Pulmonary/Chest: Effort normal and breath sounds normal. No respiratory distress. He has no wheezes. He has no rales.   Abdominal: Soft. Bowel sounds are normal. He exhibits no distension. There is no tenderness. There is no guarding.   Neurological: He is alert.   Skin: Skin is warm and dry. No rash noted.   Right lower leg rash is persistent but improved redness is improved his face is less flushed today   Psychiatric: He has a normal mood and affect. His speech is normal and behavior is normal. Cognition and memory are impaired. He exhibits abnormal recent memory " and abnormal remote memory.   Nursing note and vitals reviewed.      Medication Review:   I have reviewed the patient's current medication list  Scheduled Meds:    ARIPiprazole 7.5 mg Oral Q PM   aspirin 81 mg Oral Daily   atorvastatin 10 mg Oral Daily   ceftriaxone 1 g Intravenous Q24H   cetirizine 5 mg Oral Nightly   dabigatran etexilate 150 mg Oral Q12H   digoxin 125 mcg Oral Daily   famotidine 20 mg Oral BID   furosemide 20 mg Oral Daily   levothyroxine 75 mcg Oral Daily   metoprolol succinate XL 25 mg Oral Daily   multivitamin with minerals 1 tablet Oral Daily   sodium chloride 3 mL Intravenous Q12H   vancomycin 1,500 mg Intravenous Q24H     Continuous Infusions:    Pharmacy to dose vancomycin      PRN Meds:.•  acetaminophen  •  bisacodyl  •  calcium carbonate  •  magnesium hydroxide  •  Pharmacy to dose vancomycin  •  sodium chloride  •  sodium chloride  @medsinfusion@      Labs:  Results from last 7 days   Lab Units  12/06/18 1857   WBC 10*3/mm3  6.72   HEMOGLOBIN g/dL  14.0   HEMATOCRIT %  42.3   PLATELETS 10*3/mm3  139*     Results from last 7 days   Lab Units  12/06/18 1857   SODIUM mmol/L  141   POTASSIUM mmol/L  4.2   CHLORIDE mmol/L  99   CO2 mmol/L  33.8*   BUN mg/dL  18   CREATININE mg/dL  1.04   CALCIUM mg/dL  9.8   BILIRUBIN mg/dL  0.9   ALK PHOS U/L  49   ALT (SGPT) U/L  13   AST (SGOT) U/L  17   GLUCOSE mg/dL  110*           Lab Results (last 24 hours)     Procedure Component Value Units Date/Time    Blood Culture - Blood, Arm, Right [[id number]] Collected:  12/06/18 1941    Specimen:  Blood from Arm, Right Updated:  12/07/18 1946     Blood Culture No growth at 24 hours                                  Invalid input(s): LDLCALC          No results found for: POCGLU      Results from last 7 days   Lab Units  12/06/18 1941   BLOODCX   No growth at 24 hours                 Radiology:  Imaging Results (last 24 hours)     ** No results found for the last 24 hours. **          Cardiology:  ECG/EMG  Results (last 24 hours)     ** No results found for the last 24 hours. **          I have reviewed recent labs results and consult notes.  Parts of this note may have been copied and pasted but patient was examined and interviewed by me today    Assessment and Plan:    1.  Recurrent right lower extremity cellulitis with stasis dermatitis is improving dermatology note is appreciated will continue with IV antibiotics     2.  Paroxysmal atrial fibrillation in sinus rhythm will continue Pradaxa  rate is controlled     3.  Hypertension well controlled nothing acute home medications we continued     4.  GERD nothing acute home H2 blocker will be continued     5.  Moderate Alzheimer's dementia stable     6.  Depression anxiety Abilify will be continued     7.  Recurrent dermatitis dermatology consult     8.  Coronary disease status post CABG nothing acute     9.  Diet-controlled diabetes stable

## 2018-12-08 NOTE — PLAN OF CARE
Problem: Fall Risk (Adult)  Goal: Absence of Fall  Outcome: Ongoing (interventions implemented as appropriate)   12/08/18 0526   Fall Risk (Adult)   Absence of Fall making progress toward outcome       Problem: Infection, Risk/Actual (Adult)  Goal: Infection Prevention/Resolution  Outcome: Ongoing (interventions implemented as appropriate)   12/08/18 0526   Infection, Risk/Actual (Adult)   Infection Prevention/Resolution making progress toward outcome       Problem: Skin Injury Risk (Adult)  Goal: Identify Related Risk Factors and Signs and Symptoms   12/08/18 0526   Skin Injury Risk (Adult)   Related Risk Factors (Skin Injury Risk) cognitive impairment;mobility impaired     Goal: Skin Health and Integrity  Outcome: Ongoing (interventions implemented as appropriate)

## 2018-12-08 NOTE — PLAN OF CARE
Problem: Patient Care Overview  Goal: Plan of Care Review  Outcome: Ongoing (interventions implemented as appropriate)      Problem: Fall Risk (Adult)  Goal: Identify Related Risk Factors and Signs and Symptoms  Outcome: Ongoing (interventions implemented as appropriate)    Goal: Absence of Fall  Outcome: Ongoing (interventions implemented as appropriate)      Problem: Infection, Risk/Actual (Adult)  Goal: Identify Related Risk Factors and Signs and Symptoms  Outcome: Ongoing (interventions implemented as appropriate)      Problem: Skin Injury Risk (Adult)  Goal: Identify Related Risk Factors and Signs and Symptoms  Outcome: Ongoing (interventions implemented as appropriate)    Goal: Skin Health and Integrity  Outcome: Ongoing (interventions implemented as appropriate)

## 2018-12-09 LAB — VANCOMYCIN SERPL-MCNC: <4 MCG/ML (ref 5–40)

## 2018-12-09 PROCEDURE — 80202 ASSAY OF VANCOMYCIN: CPT | Performed by: FAMILY MEDICINE

## 2018-12-09 PROCEDURE — 25010000002 CEFTRIAXONE SODIUM-DEXTROSE 1-3.74 GM-%(50ML) RECONSTITUTED SOLUTION: Performed by: FAMILY MEDICINE

## 2018-12-09 RX ADMIN — SODIUM CHLORIDE, PRESERVATIVE FREE 3 ML: 5 INJECTION INTRAVENOUS at 20:04

## 2018-12-09 RX ADMIN — CEFTRIAXONE 1 G: 1 INJECTION, SOLUTION INTRAVENOUS at 17:43

## 2018-12-09 RX ADMIN — ASPIRIN 81 MG 81 MG: 81 TABLET ORAL at 09:57

## 2018-12-09 RX ADMIN — CETIRIZINE HYDROCHLORIDE 5 MG: 10 TABLET, FILM COATED ORAL at 20:04

## 2018-12-09 RX ADMIN — LEVOTHYROXINE SODIUM 75 MCG: 75 TABLET ORAL at 09:59

## 2018-12-09 RX ADMIN — METOPROLOL SUCCINATE 25 MG: 25 TABLET, EXTENDED RELEASE ORAL at 10:02

## 2018-12-09 RX ADMIN — DABIGATRAN ETEXILATE MESYLATE 150 MG: 75 CAPSULE ORAL at 20:01

## 2018-12-09 RX ADMIN — DIGOXIN 125 MCG: 125 TABLET ORAL at 12:23

## 2018-12-09 RX ADMIN — Medication 1 TABLET: at 09:59

## 2018-12-09 RX ADMIN — FUROSEMIDE 20 MG: 20 TABLET ORAL at 09:58

## 2018-12-09 RX ADMIN — ATORVASTATIN CALCIUM 10 MG: 10 TABLET, FILM COATED ORAL at 09:57

## 2018-12-09 RX ADMIN — SODIUM CHLORIDE, PRESERVATIVE FREE 3 ML: 5 INJECTION INTRAVENOUS at 10:00

## 2018-12-09 RX ADMIN — FAMOTIDINE 20 MG: 20 TABLET ORAL at 20:02

## 2018-12-09 RX ADMIN — DABIGATRAN ETEXILATE MESYLATE 150 MG: 75 CAPSULE ORAL at 12:59

## 2018-12-09 RX ADMIN — FAMOTIDINE 20 MG: 20 TABLET ORAL at 09:58

## 2018-12-09 RX ADMIN — PETROLATUM: 42 OINTMENT TOPICAL at 20:01

## 2018-12-09 RX ADMIN — ARIPIPRAZOLE 7.5 MG: 5 TABLET ORAL at 16:22

## 2018-12-09 RX ADMIN — PETROLATUM 1 APPLICATION: 42 OINTMENT TOPICAL at 10:01

## 2018-12-09 NOTE — PROGRESS NOTES
"Daily Progress Note:      Chief complaint: Follow-up of cellulitis hypertension and dementia    Subjective: Without complaints, wife at bedside    Vital Signs  Temp:  [97.4 °F (36.3 °C)-97.8 °F (36.6 °C)] 97.8 °F (36.6 °C)  Heart Rate:  [60-70] 64  Resp:  [18] 18  BP: (118-136)/(56-67) 118/67  Oxygen Therapy  SpO2: 98 %  Pulse Oximetry Type: Intermittent  Device (Oxygen Therapy): room air}  Body mass index is 24.55 kg/m².  Flowsheet Rows      First Filed Value   Admission Height  180.3 cm (71\") Documented at 12/06/2018 1717   Admission Weight  79.8 kg (176 lb) Documented at 12/06/2018 1717                   Documented weights    12/06/18 1717   Weight: 79.8 kg (176 lb)           Patient Vitals for the past 24 hrs:   BP Temp Temp src Pulse Resp SpO2   12/09/18 1223 -- -- -- 64 -- --   12/09/18 1005 118/67 -- -- 60 -- --   12/08/18 1900 134/56 97.8 °F (36.6 °C) Oral 70 18 98 %   12/08/18 1500 136/64 97.4 °F (36.3 °C) Oral 60 18 97 %       79.8 kg (176 lb)      Intake/Output Summary (Last 24 hours) at 12/9/2018 1322  Last data filed at 12/9/2018 0900  Gross per 24 hour   Intake 650 ml   Output --   Net 650 ml       Review of Systems   Unable to perform ROS: Dementia       Physical Exam   Constitutional: He appears well-developed and well-nourished.   HENT:   Head: Normocephalic.   Mouth/Throat: Oropharynx is clear and moist.   Eyes: Conjunctivae are normal.   Neck: Normal range of motion. No JVD present. No thyromegaly present.   Cardiovascular: Normal rate and regular rhythm.   Murmur heard.  Pulmonary/Chest: Effort normal and breath sounds normal. No respiratory distress. He has no wheezes. He has no rales.   Abdominal: Soft. Bowel sounds are normal. He exhibits no distension. There is no tenderness. There is no guarding.   Neurological: He is alert.   Skin: Skin is warm and dry. No rash noted.   Right lower leg rash is persistent improving just his right anterior leg   Psychiatric: He has a normal mood and affect. " His speech is normal and behavior is normal. Cognition and memory are impaired. He exhibits abnormal recent memory and abnormal remote memory.   Nursing note and vitals reviewed.      Medication Review:   I have reviewed the patient's current medication list  Scheduled Meds:    ARIPiprazole 7.5 mg Oral Q PM   aspirin 81 mg Oral Daily   atorvastatin 10 mg Oral Daily   ceftriaxone 1 g Intravenous Q24H   cetirizine 5 mg Oral Nightly   dabigatran etexilate 150 mg Oral Q12H   digoxin 125 mcg Oral Daily   famotidine 20 mg Oral BID   furosemide 20 mg Oral Daily   hydrophor  Topical Q12H   levothyroxine 75 mcg Oral Daily   metoprolol succinate XL 25 mg Oral Daily   multivitamin with minerals 1 tablet Oral Daily   sodium chloride 3 mL Intravenous Q12H     Continuous Infusions:    Pharmacy to dose vancomycin      PRN Meds:.•  acetaminophen  •  bisacodyl  •  calcium carbonate  •  magnesium hydroxide  •  Pharmacy to dose vancomycin  •  sodium chloride  •  sodium chloride  @medsinfusion@      Labs:  Results from last 7 days   Lab Units  12/06/18   1857   WBC 10*3/mm3  6.72   HEMOGLOBIN g/dL  14.0   HEMATOCRIT %  42.3   PLATELETS 10*3/mm3  139*     Results from last 7 days   Lab Units  12/06/18   1857   SODIUM mmol/L  141   POTASSIUM mmol/L  4.2   CHLORIDE mmol/L  99   CO2 mmol/L  33.8*   BUN mg/dL  18   CREATININE mg/dL  1.04   CALCIUM mg/dL  9.8   BILIRUBIN mg/dL  0.9   ALK PHOS U/L  49   ALT (SGPT) U/L  13   AST (SGOT) U/L  17   GLUCOSE mg/dL  110*           Lab Results (last 24 hours)     Procedure Component Value Units Date/Time    Blood Culture - Blood, Arm, Right [[id number]] Collected:  12/06/18 1941    Specimen:  Blood from Arm, Right Updated:  12/08/18 1945     Blood Culture No growth at 2 days                                  Invalid input(s): LDLCALC          No results found for: POCGLU      Results from last 7 days   Lab Units  12/06/18 1941   BLOODCX   No growth at 2 days                 Radiology:  Imaging  Results (last 24 hours)     ** No results found for the last 24 hours. **          Cardiology:  ECG/EMG Results (last 24 hours)     ** No results found for the last 24 hours. **          I have reviewed recent labs results and consult notes.  Parts of this note may have been copied and pasted but patient was examined and interviewed by me today    Assessment and Plan:    1.  Recurrent right lower extremity cellulitis with stasis dermatitis is improving we'll change to by mouth antibiotics likely discharge home in the morning     2.  Paroxysmal atrial fibrillation in sinus rhythm will continue Pradaxa  rate is controlled     3.  Hypertension well controlled nothing acute home medications we continued     4.  GERD nothing acute home H2 blocker will be continued     5.  Moderate Alzheimer's dementia stable     6.  Depression anxiety Abilify will be continued     7.  Recurrent dermatitis dermatology consult     8.  Coronary disease status post CABG nothing acute     9.  Diet-controlled diabetes stable

## 2018-12-09 NOTE — PLAN OF CARE
Problem: Patient Care Overview  Goal: Plan of Care Review   12/09/18 0538   Coping/Psychosocial   Plan of Care Reviewed With patient   Plan of Care Review   Progress improving

## 2018-12-10 VITALS
RESPIRATION RATE: 20 BRPM | HEIGHT: 71 IN | TEMPERATURE: 97 F | SYSTOLIC BLOOD PRESSURE: 118 MMHG | WEIGHT: 176 LBS | OXYGEN SATURATION: 96 % | BODY MASS INDEX: 24.64 KG/M2 | HEART RATE: 68 BPM | DIASTOLIC BLOOD PRESSURE: 60 MMHG

## 2018-12-10 LAB
ANION GAP SERPL CALCULATED.3IONS-SCNC: 11.5 MMOL/L
BUN BLD-MCNC: 18 MG/DL (ref 8–23)
BUN/CREAT SERPL: 22 (ref 7–25)
CALCIUM SPEC-SCNC: 9 MG/DL (ref 8.8–10.5)
CHLORIDE SERPL-SCNC: 103 MMOL/L (ref 98–107)
CO2 SERPL-SCNC: 26.5 MMOL/L (ref 22–29)
CREAT BLD-MCNC: 0.82 MG/DL (ref 0.76–1.27)
DEPRECATED RDW RBC AUTO: 46.1 FL (ref 37–54)
ERYTHROCYTE [DISTWIDTH] IN BLOOD BY AUTOMATED COUNT: 14 % (ref 11.5–14.5)
GFR SERPL CREATININE-BSD FRML MDRD: 90 ML/MIN/1.73
GLUCOSE BLD-MCNC: 127 MG/DL (ref 65–99)
HCT VFR BLD AUTO: 37.9 % (ref 42–52)
HGB BLD-MCNC: 12.8 G/DL (ref 14–18)
MCH RBC QN AUTO: 30.5 PG (ref 27–31)
MCHC RBC AUTO-ENTMCNC: 33.8 G/DL (ref 31–37)
MCV RBC AUTO: 90.2 FL (ref 80–94)
PLATELET # BLD AUTO: 118 10*3/MM3 (ref 140–500)
PMV BLD AUTO: 9.8 FL (ref 7.4–10.4)
POTASSIUM BLD-SCNC: 3.6 MMOL/L (ref 3.5–5.2)
RBC # BLD AUTO: 4.2 10*6/MM3 (ref 4.7–6.1)
SODIUM BLD-SCNC: 141 MMOL/L (ref 136–145)
WBC NRBC COR # BLD: 7.92 10*3/MM3 (ref 4.8–10.8)

## 2018-12-10 PROCEDURE — 80048 BASIC METABOLIC PNL TOTAL CA: CPT | Performed by: FAMILY MEDICINE

## 2018-12-10 PROCEDURE — 85027 COMPLETE CBC AUTOMATED: CPT | Performed by: FAMILY MEDICINE

## 2018-12-10 RX ORDER — DOXYCYCLINE 100 MG/1
100 TABLET ORAL DAILY
Qty: 14 TABLET | Refills: 0 | Status: SHIPPED | OUTPATIENT
Start: 2018-12-10 | End: 2019-05-23

## 2018-12-10 RX ORDER — CETIRIZINE HYDROCHLORIDE 5 MG/1
5 TABLET ORAL NIGHTLY
Qty: 30 TABLET | Refills: 2 | Status: SHIPPED | OUTPATIENT
Start: 2018-12-10 | End: 2019-05-28 | Stop reason: HOSPADM

## 2018-12-10 RX ADMIN — METOPROLOL SUCCINATE 25 MG: 25 TABLET, EXTENDED RELEASE ORAL at 09:09

## 2018-12-10 RX ADMIN — LEVOTHYROXINE SODIUM 75 MCG: 75 TABLET ORAL at 09:13

## 2018-12-10 RX ADMIN — FUROSEMIDE 20 MG: 20 TABLET ORAL at 09:09

## 2018-12-10 RX ADMIN — PETROLATUM 1 APPLICATION: 42 OINTMENT TOPICAL at 09:13

## 2018-12-10 RX ADMIN — ATORVASTATIN CALCIUM 10 MG: 10 TABLET, FILM COATED ORAL at 09:09

## 2018-12-10 RX ADMIN — SODIUM CHLORIDE, PRESERVATIVE FREE 3 ML: 5 INJECTION INTRAVENOUS at 09:14

## 2018-12-10 RX ADMIN — FAMOTIDINE 20 MG: 20 TABLET ORAL at 09:13

## 2018-12-10 RX ADMIN — ASPIRIN 81 MG 81 MG: 81 TABLET ORAL at 09:12

## 2018-12-10 RX ADMIN — Medication 1 TABLET: at 09:09

## 2018-12-10 RX ADMIN — DABIGATRAN ETEXILATE MESYLATE 150 MG: 75 CAPSULE ORAL at 09:13

## 2018-12-10 NOTE — PLAN OF CARE
Problem: Patient Care Overview  Goal: Plan of Care Review  Outcome: Ongoing (interventions implemented as appropriate)      Problem: Fall Risk (Adult)  Goal: Identify Related Risk Factors and Signs and Symptoms  Outcome: Ongoing (interventions implemented as appropriate)   12/10/18 0444   Fall Risk (Adult)   Related Risk Factors (Fall Risk) gait/mobility problems       Problem: Skin Injury Risk (Adult)  Goal: Skin Health and Integrity  Outcome: Ongoing (interventions implemented as appropriate)   12/10/18 0444   Skin Injury Risk (Adult)   Skin Health and Integrity making progress toward outcome

## 2018-12-10 NOTE — PROGRESS NOTES
"Daily Progress Note:      Chief complaint: Follow-up of cellulitis hypertension and dementia    Subjective: Without complaints,     Vital Signs  Temp:  [96.8 °F (36 °C)-97.8 °F (36.6 °C)] 97 °F (36.1 °C)  Heart Rate:  [59-64] 59  Resp:  [18] 18  BP: (116-151)/(53-73) 139/73  Oxygen Therapy  SpO2: 96 %  Pulse Oximetry Type: Intermittent  Device (Oxygen Therapy): room air}  Body mass index is 24.55 kg/m².  Flowsheet Rows      First Filed Value   Admission Height  180.3 cm (71\") Documented at 12/06/2018 1717   Admission Weight  79.8 kg (176 lb) Documented at 12/06/2018 1717                   Documented weights    12/06/18 1717   Weight: 79.8 kg (176 lb)           Patient Vitals for the past 24 hrs:   BP Temp Temp src Pulse Resp SpO2   12/10/18 0634 139/73 97 °F (36.1 °C) Oral 59 18 96 %   12/09/18 2300 116/53 97.7 °F (36.5 °C) Oral 59 18 95 %   12/09/18 1900 151/70 96.8 °F (36 °C) Oral 64 18 97 %   12/09/18 1520 150/73 97.5 °F (36.4 °C) Oral 60 18 97 %   12/09/18 1459 116/60 97.8 °F (36.6 °C) Oral 62 18 95 %   12/09/18 1223 -- -- -- 64 -- --   12/09/18 1005 118/67 -- -- 60 -- --       79.8 kg (176 lb)      Intake/Output Summary (Last 24 hours) at 12/10/2018 0750  Last data filed at 12/9/2018 1751  Gross per 24 hour   Intake 770 ml   Output --   Net 770 ml       Review of Systems   Unable to perform ROS: Dementia       Physical Exam   Constitutional: He appears well-developed and well-nourished.   HENT:   Head: Normocephalic.   Mouth/Throat: Oropharynx is clear and moist.   Eyes: Conjunctivae are normal.   Neck: Normal range of motion. No JVD present. No thyromegaly present.   Cardiovascular: Normal rate and regular rhythm.   Murmur heard.  Pulmonary/Chest: Effort normal and breath sounds normal. No respiratory distress. He has no wheezes. He has no rales.   Abdominal: Soft. Bowel sounds are normal. He exhibits no distension. There is no tenderness. There is no guarding.   Neurological: He is alert.   Skin: Skin is warm " and dry. No rash noted.   Right lower leg rash is persistent improving just his right anterior leg   Psychiatric: He has a normal mood and affect. His speech is normal and behavior is normal. Cognition and memory are impaired. He exhibits abnormal recent memory and abnormal remote memory.   Nursing note and vitals reviewed.      Medication Review:   I have reviewed the patient's current medication list  Scheduled Meds:    ARIPiprazole 7.5 mg Oral Q PM   aspirin 81 mg Oral Daily   atorvastatin 10 mg Oral Daily   ceftriaxone 1 g Intravenous Q24H   cetirizine 5 mg Oral Nightly   dabigatran etexilate 150 mg Oral Q12H   digoxin 125 mcg Oral Daily   famotidine 20 mg Oral BID   furosemide 20 mg Oral Daily   hydrophor  Topical Q12H   levothyroxine 75 mcg Oral Daily   metoprolol succinate XL 25 mg Oral Daily   multivitamin with minerals 1 tablet Oral Daily   sodium chloride 3 mL Intravenous Q12H     Continuous Infusions:    Pharmacy to dose vancomycin      PRN Meds:.•  acetaminophen  •  bisacodyl  •  calcium carbonate  •  magnesium hydroxide  •  Pharmacy to dose vancomycin  •  sodium chloride  •  sodium chloride  @medsinfusion@      Labs:  Results from last 7 days   Lab Units  12/06/18   1857   WBC 10*3/mm3  6.72   HEMOGLOBIN g/dL  14.0   HEMATOCRIT %  42.3   PLATELETS 10*3/mm3  139*     Results from last 7 days   Lab Units  12/06/18   1857   SODIUM mmol/L  141   POTASSIUM mmol/L  4.2   CHLORIDE mmol/L  99   CO2 mmol/L  33.8*   BUN mg/dL  18   CREATININE mg/dL  1.04   CALCIUM mg/dL  9.8   BILIRUBIN mg/dL  0.9   ALK PHOS U/L  49   ALT (SGPT) U/L  13   AST (SGOT) U/L  17   GLUCOSE mg/dL  110*           Lab Results (last 24 hours)     Procedure Component Value Units Date/Time    Blood Culture - Blood, Arm, Right [[id number]] Collected:  12/06/18 1941    Specimen:  Blood from Arm, Right Updated:  12/09/18 1945     Blood Culture No growth at 3 days    Vancomycin, Random [[id number]]  (Abnormal) Collected:  12/09/18 1703     Specimen:  Blood Updated:  12/09/18 1740     Vancomycin Random <4.00 mcg/mL                                   Invalid input(s): LDLCALC          No results found for: POCGLU      Results from last 7 days   Lab Units  12/06/18   1941   BLOODCX   No growth at 3 days                 Radiology:  Imaging Results (last 24 hours)     ** No results found for the last 24 hours. **          Cardiology:  ECG/EMG Results (last 24 hours)     ** No results found for the last 24 hours. **          I have reviewed recent labs results and consult notes.  Parts of this note may have been copied and pasted but patient was examined and interviewed by me today    Assessment and Plan:    1.  Recurrent right lower extremity cellulitis with stasis dermatitis is improving home on by mouth doxycycline     2.  Paroxysmal atrial fibrillation in sinus rhythm will continue Pradaxa  rate is controlled     3.  Hypertension well controlled nothing acute home medications we continued     4.  GERD nothing acute home H2 blocker will be continued     5.  Moderate Alzheimer's dementia stable     6.  Depression anxiety Abilify will be continued     7.  Recurrent dermatitis dermatology consult     8.  Coronary disease status post CABG nothing acute     9.  Diet-controlled diabetes stable

## 2018-12-10 NOTE — NURSING NOTE
Continued Stay Note  [facility]     Patient Name: Akshat Jimenez  MRN: [id number]  Today's Date: 12/10/2018    Admit Date: 12/6/2018    Discharge Plan     Row Name 12/10/18 0924       Plan    Plan  plan home, assess needs    Patient/Family in Agreement with Plan  yes    Plan Comments  Spoke with patient at bedside, IMM updated. Patient does not anticipate any needs at dc. CM# placed on white board, will continue to follow.        Discharge Codes    No documentation.       Expected Discharge Date and Time     Expected Discharge Date Expected Discharge Time    Dec 10, 2018             Jesse Wilson RN

## 2018-12-10 NOTE — DISCHARGE INSTR - LAB
DR. BARBOZA(DERMATOLOGY) ON January 3, 2018 AT 9:50A.M. IN [place]     DR. PURCELL ON December 13,2018 AT 4:15P.M.

## 2018-12-11 ENCOUNTER — READMISSION MANAGEMENT (OUTPATIENT)
Dept: CALL CENTER | Facility: HOSPITAL | Age: 83
End: 2018-12-11

## 2018-12-11 LAB — BACTERIA SPEC AEROBE CULT: NORMAL

## 2018-12-11 NOTE — OUTREACH NOTE
Prep Survey      Responses   Facility patient discharged from?  [facility]   Is LACE score < 7 ?  No   Is patient eligible?  Yes   Discharge diagnosis  Recurrent RLE cellulitis unresp. to outpt. therapy, PAF on Pradaxa, HTN, GERD, Mod. Alzheimer's dementia, depression, anxiety, recurrent dermatitis, CAD s/p CABG, NIDDM    Does the patient have one of the following disease processes/diagnoses(primary or secondary)?  Other   Does the patient have Home health ordered?  No   Is there a DME ordered?  No   Comments regarding appointments  See AVS   Prep survey completed?  Yes          Enedina Covarrubias RN

## 2018-12-12 ENCOUNTER — READMISSION MANAGEMENT (OUTPATIENT)
Dept: CALL CENTER | Facility: HOSPITAL | Age: 83
End: 2018-12-12

## 2018-12-12 NOTE — OUTREACH NOTE
Medical Week 1 Survey      Responses   Facility patient discharged from?  [facility]   Does the patient have one of the following disease processes/diagnoses(primary or secondary)?  Other   Is there a successful TCM telephone encounter documented?  No   Week 1 attempt successful?  No   Unsuccessful attempts  Attempt 1          Nicole Ford RN

## 2018-12-13 ENCOUNTER — READMISSION MANAGEMENT (OUTPATIENT)
Dept: CALL CENTER | Facility: HOSPITAL | Age: 83
End: 2018-12-13

## 2018-12-13 NOTE — OUTREACH NOTE
Medical Week 1 Survey      Responses   Facility patient discharged from?  [facility]   Does the patient have one of the following disease processes/diagnoses(primary or secondary)?  Other   Is there a successful TCM telephone encounter documented?  No   Week 1 attempt successful?  Yes   Call start time  1606   Call end time  1610   Meds reviewed with patient/caregiver?  Yes   Is the patient having any side effects they believe may be caused by any medication additions or changes?  No   Does the patient have all medications ordered at discharge?  Yes   Is the patient taking all medications as directed (includes completed medication regime)?  Yes   Does the patient have a primary care provider?   Yes   Does the patient have an appointment with their PCP within 7 days of discharge?  Yes   Has the patient kept scheduled appointments due by today?  Yes   Did the patient receive a copy of their discharge instructions?  Yes   Nursing interventions  Reviewed instructions with patient   What is the patient's perception of their health status since discharge?  Improving   Is the patient/caregiver able to teach back signs and symptoms related to disease process for when to call PCP?  Yes   Is the patient/caregiver able to teach back signs and symptoms related to disease process for when to call 911?  Yes   Is the patient/caregiver able to teach back the hierarchy of who to call/visit for symptoms/problems? PCP, Specialist, Home health nurse, Urgent Care, ED, 911  Yes   Week 1 call completed?  Yes          Key Parker RN

## 2018-12-18 NOTE — DISCHARGE SUMMARY
Akshat Jimenez  1935  [id number]        Discharge Summary    Date of Admission: 12/6/2018  Date of Discharge:  12/10/18  Primary Discharge Diagnoses:   Right Lower extremity cellulitis  Venous stasis dermatitis    Secondary Discharge Diagnoses:   Hypertension  Hyperlipidemia  Diet-controlled diabetes  Paroxysmal atrial fibrillation with chronic anticoagulation  Moderate Alzheimer's dementia  Major depression with atypical features  GERD  Sick sinus syndrome status post pacemaker placement  Coronary disease status post CABG          PCP  Patient Care Team:  Adalberto Hudson MD as PCP - General  Adalberto Hudson MD as PCP - Family Medicine    Consults:   Consults     No orders found from 11/7/2018 to 12/7/2018.            History of Present Illness:  82-year-old white male with prior history of cellulitis of his right lower extremity) the office by his wife because of increasing right lower extremity redness and discomfort.  There has been no fever or chills.  The wife states patient gets small vesicles with some redness in his right anterior leg which very much resolves and then recurs.  About a week ago patient had another outbreak but this time did not improve she does small amount of drainage and subsequently has had persistently increasing redness now going  up to his medial right thigh.  She called my office was prescribed Keflex about 3 days ago with no improvement.  She also notices patchy rash on the anterior abdomen and face which started yesterday.          Hospital Course  Patient was admitted to the hospital and blood culture were done.  Initially started on Rocephin and vancomycin.  Blood cultures remain negative his cellulitis remarkably improved his edema also improved.  He had a venous Doppler did not show any evidence of DVT.  His vancomycin was discontinued was continued IV Rocephin and changed over to by mouth Keflex.  Dermatology saw patient consultation felt that he  had venous stasis dermatitis with superimposed cellulitis and concurred with our present management.      Operations and Procedures Performed:       Us Venous Doppler Lower Extremity Right (duplex)    Result Date: 12/7/2018  Narrative: VENOUS DOPPLER ULTRASOUND, RIGHT LOWER EXTREMITY, 12/6/2018  HISTORY: 82-year-old male admitted to the hospital today with diagnosis of right lower extremity cellulitis. He notes a one week history of right calf redness and edema/swelling.  TECHNIQUE: Venous Doppler ultrasound examination of the right leg was performed using grey-scale, spectral Doppler, and color flow Doppler ultrasound imaging.  FINDINGS: There is no evidence of deep venous thrombosis from the groin to the lower calf. The greater saphenous vein is also patent. Note is made of edema within the subcutaneous soft tissues of the calf.      Impression: Negative examination.  No evidence of right lower extremity DVT.  This report was finalized on 12/7/2018 6:55 AM by Dr. Leonides Mckeon MD.        Labs:        Invalid input(s): NEUTOPHILPCT        Invalid input(s): LABALBU, PROT        Lab Results (last 24 hours)     Procedure Component Value Units Date/Time    Basic Metabolic Panel [[id number]]  (Abnormal) Collected:  12/10/18 0743    Specimen:  Blood Updated:  12/10/18 0826     Glucose 127 mg/dL      BUN 18 mg/dL      Creatinine 0.82 mg/dL      Sodium 141 mmol/L      Potassium 3.6 mmol/L      Chloride 103 mmol/L      CO2 26.5 mmol/L      Calcium 9.0 mg/dL      eGFR Non African Amer 90 mL/min/1.73      BUN/Creatinine Ratio 22.0     Anion Gap 11.5 mmol/L     Narrative:       The MDRD GFR formula is only valid for adults with stable renal function between ages 18 and 70.    CBC (No Diff) [[id number]]  (Abnormal) Collected:  12/10/18 0743    Specimen:  Blood Updated:  12/10/18 0808     WBC 7.92 10*3/mm3      RBC 4.20 10*6/mm3      Hemoglobin 12.8 g/dL      Hematocrit 37.9 %      MCV 90.2 fL      MCH 30.5 pg      MCHC 33.8  g/dL      RDW 14.0 %      RDW-SD 46.1 fl      MPV 9.8 fL      Platelets 118 10*3/mm3     Vancomycin, Random [[id number]]  (Abnormal) Collected:  12/09/18 1703    Specimen:  Blood Updated:  12/09/18 1740     Vancomycin Random <4.00 mcg/mL                                   Invalid input(s): LDLCALC          No results found for: POCGLU                      Radiology:  Imaging Results (last 24 hours)     ** No results found for the last 24 hours. **          PROCEDURES          Allergies:  has No Known Allergies.    George  reviewed    Discharge Medications:     Discharge Medications      New Medications      Instructions Start Date   cetirizine 5 MG tablet  Commonly known as:  zyrTEC   5 mg, Oral, Nightly      doxycycline 100 MG tablet  Commonly known as:  ADOXA   100 mg, Oral, Daily         Continue These Medications      Instructions Start Date   ARIPiprazole 5 MG tablet  Commonly known as:  ABILIFY   7.5 mg, Oral, Every Evening      aspirin 81 MG chewable tablet   81 mg, Oral, Daily      dabigatran etexilate 150 MG capsu  Commonly known as:  PRADAXA   150 mg, Oral, 2 Times Daily      digoxin 250 MCG tablet  Commonly known as:  LANOXIN   125 mcg, Oral, Daily Digoxin      furosemide 20 MG tablet  Commonly known as:  LASIX   20 mg, Oral, 2 Times Daily PRN      levothyroxine 75 MCG tablet  Commonly known as:  SYNTHROID, LEVOTHROID   75 mcg, Oral, Daily      metoprolol succinate XL 25 MG 24 hr tablet  Commonly known as:  TOPROL-XL   25 mg, Oral, Daily      multivitamin with minerals tablet tablet   1 tablet, Oral, Daily      raNITIdine 150 MG tablet  Commonly known as:  ZANTAC   150 mg, Oral, 2 Times Daily      simvastatin 40 MG tablet  Commonly known as:  ZOCOR   40 mg, Oral, Nightly               Condition on Discharge:  home    Discharge Disposition  home    Visiting Nurse:    No     Home PT/OT:  No     Home Safety Evaluation:  No     DME  none    Discharge Diet:        Activity at Discharge:  As  tolerated      Follow-up Appointments:  With my office as instructed    Test Results Pending at Discharge       Adalberto Hudson MD  12/18/18  7:32 AM

## 2018-12-26 ENCOUNTER — READMISSION MANAGEMENT (OUTPATIENT)
Dept: CALL CENTER | Facility: HOSPITAL | Age: 83
End: 2018-12-26

## 2018-12-26 NOTE — OUTREACH NOTE
Medical Week 2 Survey      Responses   Facility patient discharged from?  [facility]   Does the patient have one of the following disease processes/diagnoses(primary or secondary)?  Other   Week 2 attempt successful?  No   Unsuccessful attempts  Attempt 1          Leah Sands RN

## 2018-12-28 ENCOUNTER — READMISSION MANAGEMENT (OUTPATIENT)
Dept: CALL CENTER | Facility: HOSPITAL | Age: 83
End: 2018-12-28

## 2018-12-28 NOTE — OUTREACH NOTE
Medical Week 2 Survey      Responses   Facility patient discharged from?  [facility]   Does the patient have one of the following disease processes/diagnoses(primary or secondary)?  Other   Week 2 attempt successful?  No   Unsuccessful attempts  Attempt 2          Mari Hernandez RN

## 2019-05-23 ENCOUNTER — HOSPITAL ENCOUNTER (OUTPATIENT)
Facility: HOSPITAL | Age: 84
Discharge: HOME | End: 2019-05-28
Attending: EMERGENCY MEDICINE | Admitting: HOSPITALIST

## 2019-05-23 ENCOUNTER — APPOINTMENT (OUTPATIENT)
Dept: GENERAL RADIOLOGY | Facility: HOSPITAL | Age: 84
End: 2019-05-23

## 2019-05-23 DIAGNOSIS — K92.2 ACUTE UPPER GI BLEED: Primary | ICD-10-CM

## 2019-05-23 DIAGNOSIS — R26.2 DIFFICULTY WALKING: ICD-10-CM

## 2019-05-23 LAB
ABO GROUP BLD: NORMAL
ALBUMIN SERPL-MCNC: 3.9 G/DL (ref 3.5–5.2)
ALBUMIN/GLOB SERPL: 1.3 G/DL
ALP SERPL-CCNC: 55 U/L (ref 39–117)
ALT SERPL W P-5'-P-CCNC: 20 U/L (ref 1–41)
ANION GAP SERPL CALCULATED.3IONS-SCNC: 13.1 MMOL/L
APTT PPP: 55.8 SECONDS (ref 22.7–35.4)
AST SERPL-CCNC: 28 U/L (ref 1–40)
BASOPHILS # BLD AUTO: 0.06 10*3/MM3 (ref 0–0.2)
BASOPHILS NFR BLD AUTO: 0.9 % (ref 0–1.5)
BILIRUB SERPL-MCNC: 0.8 MG/DL (ref 0.2–1.2)
BLD GP AB SCN SERPL QL: NEGATIVE
BUN BLD-MCNC: 22 MG/DL (ref 8–23)
BUN/CREAT SERPL: 19.3 (ref 7–25)
CALCIUM SPEC-SCNC: 9.9 MG/DL (ref 8.6–10.5)
CHLORIDE SERPL-SCNC: 101 MMOL/L (ref 98–107)
CO2 SERPL-SCNC: 27.9 MMOL/L (ref 22–29)
CREAT BLD-MCNC: 1.14 MG/DL (ref 0.76–1.27)
DEPRECATED RDW RBC AUTO: 49.5 FL (ref 37–54)
DIGOXIN SERPL-MCNC: <0.3 NG/ML (ref 0.6–1.2)
EOSINOPHIL # BLD AUTO: 0.23 10*3/MM3 (ref 0–0.4)
EOSINOPHIL NFR BLD AUTO: 3.6 % (ref 0.3–6.2)
ERYTHROCYTE [DISTWIDTH] IN BLOOD BY AUTOMATED COUNT: 14.6 % (ref 12.3–15.4)
GFR SERPL CREATININE-BSD FRML MDRD: 62 ML/MIN/1.73
GLOBULIN UR ELPH-MCNC: 2.9 GM/DL
GLUCOSE BLD-MCNC: 161 MG/DL (ref 65–99)
HCT VFR BLD AUTO: 38.6 % (ref 37.5–51)
HEMOCCULT STL QL: POSITIVE
HGB BLD-MCNC: 12.5 G/DL (ref 13–17.7)
IMM GRANULOCYTES # BLD AUTO: 0.02 10*3/MM3 (ref 0–0.05)
IMM GRANULOCYTES NFR BLD AUTO: 0.3 % (ref 0–0.5)
INR PPP: 1.55 (ref 0.9–1.1)
LIPASE SERPL-CCNC: 75 U/L (ref 13–60)
LYMPHOCYTES # BLD AUTO: 1.66 10*3/MM3 (ref 0.7–3.1)
LYMPHOCYTES NFR BLD AUTO: 26.2 % (ref 19.6–45.3)
MCH RBC QN AUTO: 29.9 PG (ref 26.6–33)
MCHC RBC AUTO-ENTMCNC: 32.4 G/DL (ref 31.5–35.7)
MCV RBC AUTO: 92.3 FL (ref 79–97)
MONOCYTES # BLD AUTO: 0.62 10*3/MM3 (ref 0.1–0.9)
MONOCYTES NFR BLD AUTO: 9.8 % (ref 5–12)
NEUTROPHILS # BLD AUTO: 3.74 10*3/MM3 (ref 1.7–7)
NEUTROPHILS NFR BLD AUTO: 59.2 % (ref 42.7–76)
NRBC BLD AUTO-RTO: 0 /100 WBC (ref 0–0.2)
PLATELET # BLD AUTO: 150 10*3/MM3 (ref 140–450)
PMV BLD AUTO: 10.3 FL (ref 6–12)
POTASSIUM BLD-SCNC: 4.2 MMOL/L (ref 3.5–5.2)
PROT SERPL-MCNC: 6.8 G/DL (ref 6–8.5)
PROTHROMBIN TIME: 18.2 SECONDS (ref 11.7–14.2)
RBC # BLD AUTO: 4.18 10*6/MM3 (ref 4.14–5.8)
RH BLD: POSITIVE
SODIUM BLD-SCNC: 142 MMOL/L (ref 136–145)
T&S EXPIRATION DATE: NORMAL
WBC NRBC COR # BLD: 6.33 10*3/MM3 (ref 3.4–10.8)

## 2019-05-23 PROCEDURE — 71045 X-RAY EXAM CHEST 1 VIEW: CPT

## 2019-05-23 PROCEDURE — G0378 HOSPITAL OBSERVATION PER HR: HCPCS

## 2019-05-23 PROCEDURE — 86901 BLOOD TYPING SEROLOGIC RH(D): CPT | Performed by: EMERGENCY MEDICINE

## 2019-05-23 PROCEDURE — 96366 THER/PROPH/DIAG IV INF ADDON: CPT

## 2019-05-23 PROCEDURE — 93010 ELECTROCARDIOGRAM REPORT: CPT | Performed by: INTERNAL MEDICINE

## 2019-05-23 PROCEDURE — 83690 ASSAY OF LIPASE: CPT | Performed by: EMERGENCY MEDICINE

## 2019-05-23 PROCEDURE — 99285 EMERGENCY DEPT VISIT HI MDM: CPT

## 2019-05-23 PROCEDURE — 86900 BLOOD TYPING SEROLOGIC ABO: CPT | Performed by: EMERGENCY MEDICINE

## 2019-05-23 PROCEDURE — 80162 ASSAY OF DIGOXIN TOTAL: CPT | Performed by: EMERGENCY MEDICINE

## 2019-05-23 PROCEDURE — 85730 THROMBOPLASTIN TIME PARTIAL: CPT | Performed by: EMERGENCY MEDICINE

## 2019-05-23 PROCEDURE — 96365 THER/PROPH/DIAG IV INF INIT: CPT

## 2019-05-23 PROCEDURE — 93005 ELECTROCARDIOGRAM TRACING: CPT | Performed by: EMERGENCY MEDICINE

## 2019-05-23 PROCEDURE — 85025 COMPLETE CBC W/AUTO DIFF WBC: CPT | Performed by: EMERGENCY MEDICINE

## 2019-05-23 PROCEDURE — 86850 RBC ANTIBODY SCREEN: CPT | Performed by: EMERGENCY MEDICINE

## 2019-05-23 PROCEDURE — 85610 PROTHROMBIN TIME: CPT | Performed by: EMERGENCY MEDICINE

## 2019-05-23 PROCEDURE — 80053 COMPREHEN METABOLIC PANEL: CPT | Performed by: EMERGENCY MEDICINE

## 2019-05-23 PROCEDURE — 96375 TX/PRO/DX INJ NEW DRUG ADDON: CPT

## 2019-05-23 PROCEDURE — 82272 OCCULT BLD FECES 1-3 TESTS: CPT | Performed by: EMERGENCY MEDICINE

## 2019-05-23 RX ORDER — PANTOPRAZOLE SODIUM 40 MG/10ML
80 INJECTION, POWDER, LYOPHILIZED, FOR SOLUTION INTRAVENOUS ONCE
Status: COMPLETED | OUTPATIENT
Start: 2019-05-23 | End: 2019-05-23

## 2019-05-23 RX ORDER — SODIUM CHLORIDE 9 MG/ML
50 INJECTION, SOLUTION INTRAVENOUS CONTINUOUS
Status: DISCONTINUED | OUTPATIENT
Start: 2019-05-23 | End: 2019-05-25

## 2019-05-23 RX ORDER — HYDROXYZINE HYDROCHLORIDE 10 MG/1
10 TABLET, FILM COATED ORAL 3 TIMES DAILY PRN
Status: DISCONTINUED | OUTPATIENT
Start: 2019-05-23 | End: 2019-05-27

## 2019-05-23 RX ORDER — DIOSMIN COMPLEX NO.1 630 MG
1 TABLET ORAL DAILY
COMMUNITY

## 2019-05-23 RX ORDER — HYDROXYZINE HYDROCHLORIDE 10 MG/1
10 TABLET, FILM COATED ORAL 3 TIMES DAILY PRN
COMMUNITY
End: 2019-05-28 | Stop reason: HOSPADM

## 2019-05-23 RX ORDER — SODIUM CHLORIDE 0.9 % (FLUSH) 0.9 %
10 SYRINGE (ML) INJECTION AS NEEDED
Status: DISCONTINUED | OUTPATIENT
Start: 2019-05-23 | End: 2019-05-28 | Stop reason: HOSPADM

## 2019-05-23 RX ADMIN — SODIUM CHLORIDE 8 MG/HR: 900 INJECTION INTRAVENOUS at 23:31

## 2019-05-23 RX ADMIN — SODIUM CHLORIDE 8 MG/HR: 900 INJECTION INTRAVENOUS at 18:35

## 2019-05-23 RX ADMIN — PANTOPRAZOLE SODIUM 80 MG: 40 INJECTION, POWDER, FOR SOLUTION INTRAVENOUS at 18:34

## 2019-05-23 RX ADMIN — SODIUM CHLORIDE 75 ML/HR: 9 INJECTION, SOLUTION INTRAVENOUS at 23:28

## 2019-05-24 LAB
ANION GAP SERPL CALCULATED.3IONS-SCNC: 13 MMOL/L
BASOPHILS # BLD AUTO: 0.04 10*3/MM3 (ref 0–0.2)
BASOPHILS NFR BLD AUTO: 0.6 % (ref 0–1.5)
BUN BLD-MCNC: 20 MG/DL (ref 8–23)
BUN/CREAT SERPL: 22.2 (ref 7–25)
CALCIUM SPEC-SCNC: 9.6 MG/DL (ref 8.6–10.5)
CHLORIDE SERPL-SCNC: 103 MMOL/L (ref 98–107)
CO2 SERPL-SCNC: 25 MMOL/L (ref 22–29)
CREAT BLD-MCNC: 0.9 MG/DL (ref 0.76–1.27)
DEPRECATED RDW RBC AUTO: 48.3 FL (ref 37–54)
EOSINOPHIL # BLD AUTO: 0.25 10*3/MM3 (ref 0–0.4)
EOSINOPHIL NFR BLD AUTO: 3.6 % (ref 0.3–6.2)
ERYTHROCYTE [DISTWIDTH] IN BLOOD BY AUTOMATED COUNT: 14.4 % (ref 12.3–15.4)
GFR SERPL CREATININE-BSD FRML MDRD: 81 ML/MIN/1.73
GLUCOSE BLD-MCNC: 110 MG/DL (ref 65–99)
HCT VFR BLD AUTO: 37.5 % (ref 37.5–51)
HGB BLD-MCNC: 12.2 G/DL (ref 13–17.7)
IMM GRANULOCYTES # BLD AUTO: 0.01 10*3/MM3 (ref 0–0.05)
IMM GRANULOCYTES NFR BLD AUTO: 0.1 % (ref 0–0.5)
LYMPHOCYTES # BLD AUTO: 1.9 10*3/MM3 (ref 0.7–3.1)
LYMPHOCYTES NFR BLD AUTO: 27.4 % (ref 19.6–45.3)
MCH RBC QN AUTO: 29.8 PG (ref 26.6–33)
MCHC RBC AUTO-ENTMCNC: 32.5 G/DL (ref 31.5–35.7)
MCV RBC AUTO: 91.5 FL (ref 79–97)
MONOCYTES # BLD AUTO: 0.59 10*3/MM3 (ref 0.1–0.9)
MONOCYTES NFR BLD AUTO: 8.5 % (ref 5–12)
NEUTROPHILS # BLD AUTO: 4.14 10*3/MM3 (ref 1.7–7)
NEUTROPHILS NFR BLD AUTO: 59.8 % (ref 42.7–76)
NRBC BLD AUTO-RTO: 0 /100 WBC (ref 0–0.2)
PLATELET # BLD AUTO: 131 10*3/MM3 (ref 140–450)
PMV BLD AUTO: 10.4 FL (ref 6–12)
POTASSIUM BLD-SCNC: 4 MMOL/L (ref 3.5–5.2)
RBC # BLD AUTO: 4.1 10*6/MM3 (ref 4.14–5.8)
SODIUM BLD-SCNC: 141 MMOL/L (ref 136–145)
WBC NRBC COR # BLD: 6.93 10*3/MM3 (ref 3.4–10.8)

## 2019-05-24 PROCEDURE — 99204 OFFICE O/P NEW MOD 45 MIN: CPT | Performed by: INTERNAL MEDICINE

## 2019-05-24 PROCEDURE — 96376 TX/PRO/DX INJ SAME DRUG ADON: CPT

## 2019-05-24 PROCEDURE — 96366 THER/PROPH/DIAG IV INF ADDON: CPT

## 2019-05-24 PROCEDURE — G0378 HOSPITAL OBSERVATION PER HR: HCPCS

## 2019-05-24 PROCEDURE — 85025 COMPLETE CBC W/AUTO DIFF WBC: CPT | Performed by: HOSPITALIST

## 2019-05-24 PROCEDURE — 80048 BASIC METABOLIC PNL TOTAL CA: CPT | Performed by: HOSPITALIST

## 2019-05-24 RX ORDER — PANTOPRAZOLE SODIUM 40 MG/10ML
80 INJECTION, POWDER, LYOPHILIZED, FOR SOLUTION INTRAVENOUS ONCE
Status: DISCONTINUED | OUTPATIENT
Start: 2019-05-24 | End: 2019-05-24

## 2019-05-24 RX ORDER — ARIPIPRAZOLE 5 MG/1
7.5 TABLET ORAL EVERY EVENING
Status: DISCONTINUED | OUTPATIENT
Start: 2019-05-24 | End: 2019-05-28 | Stop reason: HOSPADM

## 2019-05-24 RX ORDER — METOPROLOL SUCCINATE 25 MG/1
25 TABLET, EXTENDED RELEASE ORAL DAILY
Status: DISCONTINUED | OUTPATIENT
Start: 2019-05-24 | End: 2019-05-28 | Stop reason: HOSPADM

## 2019-05-24 RX ORDER — ATORVASTATIN CALCIUM 10 MG/1
10 TABLET, FILM COATED ORAL DAILY
Status: DISCONTINUED | OUTPATIENT
Start: 2019-05-24 | End: 2019-05-28 | Stop reason: HOSPADM

## 2019-05-24 RX ORDER — MULTIPLE VITAMINS W/ MINERALS TAB 9MG-400MCG
1 TAB ORAL DAILY
Status: DISCONTINUED | OUTPATIENT
Start: 2019-05-24 | End: 2019-05-24

## 2019-05-24 RX ORDER — LEVOTHYROXINE SODIUM 0.07 MG/1
75 TABLET ORAL DAILY
Status: DISCONTINUED | OUTPATIENT
Start: 2019-05-24 | End: 2019-05-28 | Stop reason: HOSPADM

## 2019-05-24 RX ORDER — PANTOPRAZOLE SODIUM 40 MG/10ML
40 INJECTION, POWDER, LYOPHILIZED, FOR SOLUTION INTRAVENOUS EVERY 12 HOURS SCHEDULED
Status: DISCONTINUED | OUTPATIENT
Start: 2019-05-24 | End: 2019-05-25

## 2019-05-24 RX ORDER — CETIRIZINE HYDROCHLORIDE 10 MG/1
5 TABLET ORAL NIGHTLY
Status: DISCONTINUED | OUTPATIENT
Start: 2019-05-24 | End: 2019-05-24

## 2019-05-24 RX ADMIN — SODIUM CHLORIDE 8 MG/HR: 900 INJECTION INTRAVENOUS at 04:43

## 2019-05-24 RX ADMIN — PANTOPRAZOLE SODIUM 40 MG: 40 INJECTION, POWDER, FOR SOLUTION INTRAVENOUS at 20:35

## 2019-05-24 RX ADMIN — SODIUM CHLORIDE 8 MG/HR: 900 INJECTION INTRAVENOUS at 09:48

## 2019-05-24 RX ADMIN — ATORVASTATIN CALCIUM 10 MG: 10 TABLET, FILM COATED ORAL at 17:50

## 2019-05-24 RX ADMIN — ARIPIPRAZOLE 7.5 MG: 5 TABLET ORAL at 17:51

## 2019-05-24 RX ADMIN — LEVOTHYROXINE SODIUM 75 MCG: 75 TABLET ORAL at 15:56

## 2019-05-24 RX ADMIN — PANTOPRAZOLE SODIUM 40 MG: 40 INJECTION, POWDER, FOR SOLUTION INTRAVENOUS at 15:56

## 2019-05-24 RX ADMIN — METOPROLOL SUCCINATE 25 MG: 25 TABLET, FILM COATED, EXTENDED RELEASE ORAL at 15:55

## 2019-05-25 ENCOUNTER — ANESTHESIA EVENT (OUTPATIENT)
Dept: GASTROENTEROLOGY | Facility: HOSPITAL | Age: 84
End: 2019-05-25

## 2019-05-25 ENCOUNTER — ANESTHESIA (OUTPATIENT)
Dept: GASTROENTEROLOGY | Facility: HOSPITAL | Age: 84
End: 2019-05-25

## 2019-05-25 LAB
ALBUMIN SERPL-MCNC: 3.6 G/DL (ref 3.5–5.2)
ALBUMIN/GLOB SERPL: 1.8 G/DL
ALP SERPL-CCNC: 47 U/L (ref 39–117)
ALT SERPL W P-5'-P-CCNC: 14 U/L (ref 1–41)
ANION GAP SERPL CALCULATED.3IONS-SCNC: 12.1 MMOL/L
AST SERPL-CCNC: 26 U/L (ref 1–40)
BASOPHILS # BLD AUTO: 0.04 10*3/MM3 (ref 0–0.2)
BASOPHILS NFR BLD AUTO: 0.7 % (ref 0–1.5)
BILIRUB SERPL-MCNC: 1 MG/DL (ref 0.2–1.2)
BUN BLD-MCNC: 14 MG/DL (ref 8–23)
BUN/CREAT SERPL: 19.7 (ref 7–25)
CALCIUM SPEC-SCNC: 8.9 MG/DL (ref 8.6–10.5)
CHLORIDE SERPL-SCNC: 105 MMOL/L (ref 98–107)
CHOLEST SERPL-MCNC: 97 MG/DL (ref 0–200)
CO2 SERPL-SCNC: 23.9 MMOL/L (ref 22–29)
CREAT BLD-MCNC: 0.71 MG/DL (ref 0.76–1.27)
DEPRECATED RDW RBC AUTO: 47.8 FL (ref 37–54)
EOSINOPHIL # BLD AUTO: 0.21 10*3/MM3 (ref 0–0.4)
EOSINOPHIL NFR BLD AUTO: 3.4 % (ref 0.3–6.2)
ERYTHROCYTE [DISTWIDTH] IN BLOOD BY AUTOMATED COUNT: 14.2 % (ref 12.3–15.4)
GFR SERPL CREATININE-BSD FRML MDRD: 106 ML/MIN/1.73
GLOBULIN UR ELPH-MCNC: 2 GM/DL
GLUCOSE BLD-MCNC: 93 MG/DL (ref 65–99)
HBA1C MFR BLD: 5.4 % (ref 4.8–5.6)
HCT VFR BLD AUTO: 32.4 % (ref 37.5–51)
HDLC SERPL-MCNC: 31 MG/DL (ref 40–60)
HGB BLD-MCNC: 10.6 G/DL (ref 13–17.7)
IMM GRANULOCYTES # BLD AUTO: 0.02 10*3/MM3 (ref 0–0.05)
IMM GRANULOCYTES NFR BLD AUTO: 0.3 % (ref 0–0.5)
LDLC SERPL CALC-MCNC: 47 MG/DL (ref 0–100)
LDLC/HDLC SERPL: 1.52 {RATIO}
LYMPHOCYTES # BLD AUTO: 1.92 10*3/MM3 (ref 0.7–3.1)
LYMPHOCYTES NFR BLD AUTO: 31.4 % (ref 19.6–45.3)
MCH RBC QN AUTO: 29.9 PG (ref 26.6–33)
MCHC RBC AUTO-ENTMCNC: 32.7 G/DL (ref 31.5–35.7)
MCV RBC AUTO: 91.5 FL (ref 79–97)
MONOCYTES # BLD AUTO: 0.61 10*3/MM3 (ref 0.1–0.9)
MONOCYTES NFR BLD AUTO: 10 % (ref 5–12)
NEUTROPHILS # BLD AUTO: 3.32 10*3/MM3 (ref 1.7–7)
NEUTROPHILS NFR BLD AUTO: 54.2 % (ref 42.7–76)
NRBC BLD AUTO-RTO: 0.2 /100 WBC (ref 0–0.2)
NT-PROBNP SERPL-MCNC: 1253 PG/ML (ref 5–1800)
PLATELET # BLD AUTO: 123 10*3/MM3 (ref 140–450)
PMV BLD AUTO: 10.6 FL (ref 6–12)
POTASSIUM BLD-SCNC: 3.9 MMOL/L (ref 3.5–5.2)
PROT SERPL-MCNC: 5.6 G/DL (ref 6–8.5)
RBC # BLD AUTO: 3.54 10*6/MM3 (ref 4.14–5.8)
SODIUM BLD-SCNC: 141 MMOL/L (ref 136–145)
TRIGL SERPL-MCNC: 94 MG/DL (ref 0–150)
TSH SERPL DL<=0.05 MIU/L-ACNC: 4.36 MIU/ML (ref 0.27–4.2)
VLDLC SERPL-MCNC: 18.8 MG/DL (ref 5–40)
WBC NRBC COR # BLD: 6.12 10*3/MM3 (ref 3.4–10.8)

## 2019-05-25 PROCEDURE — 63710000001 PANTOPRAZOLE 40 MG TABLET DELAYED-RELEASE: Performed by: HOSPITALIST

## 2019-05-25 PROCEDURE — A9270 NON-COVERED ITEM OR SERVICE: HCPCS | Performed by: HOSPITALIST

## 2019-05-25 PROCEDURE — 85025 COMPLETE CBC W/AUTO DIFF WBC: CPT | Performed by: HOSPITALIST

## 2019-05-25 PROCEDURE — 83036 HEMOGLOBIN GLYCOSYLATED A1C: CPT | Performed by: HOSPITALIST

## 2019-05-25 PROCEDURE — 43239 EGD BIOPSY SINGLE/MULTIPLE: CPT | Performed by: INTERNAL MEDICINE

## 2019-05-25 PROCEDURE — 80061 LIPID PANEL: CPT | Performed by: HOSPITALIST

## 2019-05-25 PROCEDURE — 25010000002 PROPOFOL 10 MG/ML EMULSION: Performed by: ANESTHESIOLOGY

## 2019-05-25 PROCEDURE — 84443 ASSAY THYROID STIM HORMONE: CPT | Performed by: HOSPITALIST

## 2019-05-25 PROCEDURE — 96376 TX/PRO/DX INJ SAME DRUG ADON: CPT

## 2019-05-25 PROCEDURE — G0378 HOSPITAL OBSERVATION PER HR: HCPCS

## 2019-05-25 PROCEDURE — 63710000001 ARIPIPRAZOLE 5 MG TABLET: Performed by: HOSPITALIST

## 2019-05-25 PROCEDURE — 88305 TISSUE EXAM BY PATHOLOGIST: CPT | Performed by: INTERNAL MEDICINE

## 2019-05-25 PROCEDURE — 80053 COMPREHEN METABOLIC PANEL: CPT | Performed by: HOSPITALIST

## 2019-05-25 PROCEDURE — 63710000001 HYDROXYZINE 10 MG TABLET: Performed by: HOSPITALIST

## 2019-05-25 PROCEDURE — 83880 ASSAY OF NATRIURETIC PEPTIDE: CPT | Performed by: HOSPITALIST

## 2019-05-25 PROCEDURE — 63710000001 ATORVASTATIN 10 MG TABLET: Performed by: HOSPITALIST

## 2019-05-25 RX ORDER — SODIUM CHLORIDE 9 MG/ML
1000 INJECTION, SOLUTION INTRAVENOUS CONTINUOUS
Status: DISCONTINUED | OUTPATIENT
Start: 2019-05-25 | End: 2019-05-25

## 2019-05-25 RX ORDER — PANTOPRAZOLE SODIUM 40 MG/1
40 TABLET, DELAYED RELEASE ORAL
Status: DISCONTINUED | OUTPATIENT
Start: 2019-05-26 | End: 2019-05-25

## 2019-05-25 RX ORDER — LIDOCAINE HYDROCHLORIDE 20 MG/ML
INJECTION, SOLUTION INFILTRATION; PERINEURAL AS NEEDED
Status: DISCONTINUED | OUTPATIENT
Start: 2019-05-25 | End: 2019-05-25 | Stop reason: SURG

## 2019-05-25 RX ORDER — PROPOFOL 10 MG/ML
VIAL (ML) INTRAVENOUS CONTINUOUS PRN
Status: DISCONTINUED | OUTPATIENT
Start: 2019-05-25 | End: 2019-05-25 | Stop reason: SURG

## 2019-05-25 RX ORDER — SODIUM CHLORIDE 0.9 % (FLUSH) 0.9 %
3 SYRINGE (ML) INJECTION AS NEEDED
Status: DISCONTINUED | OUTPATIENT
Start: 2019-05-25 | End: 2019-05-25 | Stop reason: HOSPADM

## 2019-05-25 RX ORDER — LIDOCAINE HYDROCHLORIDE 10 MG/ML
0.5 INJECTION, SOLUTION INFILTRATION; PERINEURAL ONCE AS NEEDED
Status: DISCONTINUED | OUTPATIENT
Start: 2019-05-25 | End: 2019-05-25 | Stop reason: HOSPADM

## 2019-05-25 RX ORDER — PROPOFOL 10 MG/ML
VIAL (ML) INTRAVENOUS AS NEEDED
Status: DISCONTINUED | OUTPATIENT
Start: 2019-05-25 | End: 2019-05-25 | Stop reason: SURG

## 2019-05-25 RX ORDER — PANTOPRAZOLE SODIUM 40 MG/1
40 TABLET, DELAYED RELEASE ORAL
Status: DISCONTINUED | OUTPATIENT
Start: 2019-05-25 | End: 2019-05-28 | Stop reason: HOSPADM

## 2019-05-25 RX ADMIN — ATORVASTATIN CALCIUM 10 MG: 10 TABLET, FILM COATED ORAL at 17:34

## 2019-05-25 RX ADMIN — METOPROLOL SUCCINATE 25 MG: 25 TABLET, FILM COATED, EXTENDED RELEASE ORAL at 08:22

## 2019-05-25 RX ADMIN — PROPOFOL 100 MCG/KG/MIN: 10 INJECTION, EMULSION INTRAVENOUS at 10:11

## 2019-05-25 RX ADMIN — LIDOCAINE HYDROCHLORIDE 60 MG: 20 INJECTION, SOLUTION INFILTRATION; PERINEURAL at 10:11

## 2019-05-25 RX ADMIN — SODIUM CHLORIDE 1000 ML: 9 INJECTION, SOLUTION INTRAVENOUS at 09:37

## 2019-05-25 RX ADMIN — ARIPIPRAZOLE 7.5 MG: 5 TABLET ORAL at 17:32

## 2019-05-25 RX ADMIN — PANTOPRAZOLE SODIUM 40 MG: 40 INJECTION, POWDER, FOR SOLUTION INTRAVENOUS at 08:22

## 2019-05-25 RX ADMIN — LEVOTHYROXINE SODIUM 75 MCG: 75 TABLET ORAL at 08:22

## 2019-05-25 RX ADMIN — HYDROXYZINE HYDROCHLORIDE 10 MG: 10 TABLET ORAL at 20:06

## 2019-05-25 RX ADMIN — PROPOFOL 50 MG: 10 INJECTION, EMULSION INTRAVENOUS at 10:11

## 2019-05-25 RX ADMIN — PANTOPRAZOLE SODIUM 40 MG: 40 TABLET, DELAYED RELEASE ORAL at 17:36

## 2019-05-25 NOTE — ANESTHESIA POSTPROCEDURE EVALUATION
"Patient: Akshat Jimenez    Procedure Summary     Date:  05/25/19 Room / Location:   [facility]    Anesthesia Start:  1008 Anesthesia Stop:  1024    Procedure:  ESOPHAGOGASTRODUODENOSCOPY WITH BIOPSIES (N/A Esophagus) Diagnosis:       Acute upper GI bleed      (Acute upper GI bleed [K92.2])    Surgeon:  Sharla Mesa MD Provider:  Travis Pitts MD    Anesthesia Type:  MAC ASA Status:  3          Anesthesia Type: MAC  Last vitals  BP   100/49 (05/25/19 1032)   Temp   37.1 °C (98.8 °F) (05/25/19 0925)   Pulse   60 (05/25/19 1032)   Resp   16 (05/25/19 1032)     SpO2   100 % (05/25/19 1032)     Post Anesthesia Care and Evaluation    Patient location during evaluation: bedside  Patient participation: complete - patient participated  Level of consciousness: awake and alert  Pain management: adequate  Airway patency: patent  Anesthetic complications: No anesthetic complications    Cardiovascular status: acceptable  Respiratory status: acceptable  Hydration status: acceptable    Comments: /49 (BP Location: Left arm, Patient Position: Lying)   Pulse 60   Temp 37.1 °C (98.8 °F) (Oral)   Resp 16   Ht 180.3 cm (71\")   Wt 79.8 kg (176 lb)   SpO2 100%   BMI 24.55 kg/m²       "

## 2019-05-26 LAB
ANION GAP SERPL CALCULATED.3IONS-SCNC: 13.2 MMOL/L
BASOPHILS # BLD AUTO: 0.03 10*3/MM3 (ref 0–0.2)
BASOPHILS NFR BLD AUTO: 0.5 % (ref 0–1.5)
BUN BLD-MCNC: 15 MG/DL (ref 8–23)
BUN/CREAT SERPL: 16.3 (ref 7–25)
CALCIUM SPEC-SCNC: 9.2 MG/DL (ref 8.6–10.5)
CHLORIDE SERPL-SCNC: 103 MMOL/L (ref 98–107)
CO2 SERPL-SCNC: 24.8 MMOL/L (ref 22–29)
CREAT BLD-MCNC: 0.92 MG/DL (ref 0.76–1.27)
DEPRECATED RDW RBC AUTO: 48 FL (ref 37–54)
EOSINOPHIL # BLD AUTO: 0.09 10*3/MM3 (ref 0–0.4)
EOSINOPHIL NFR BLD AUTO: 1.6 % (ref 0.3–6.2)
ERYTHROCYTE [DISTWIDTH] IN BLOOD BY AUTOMATED COUNT: 14.3 % (ref 12.3–15.4)
GFR SERPL CREATININE-BSD FRML MDRD: 79 ML/MIN/1.73
GLUCOSE BLD-MCNC: 139 MG/DL (ref 65–99)
HCT VFR BLD AUTO: 32.4 % (ref 37.5–51)
HGB BLD-MCNC: 10.6 G/DL (ref 13–17.7)
IMM GRANULOCYTES # BLD AUTO: 0.02 10*3/MM3 (ref 0–0.05)
IMM GRANULOCYTES NFR BLD AUTO: 0.4 % (ref 0–0.5)
LYMPHOCYTES # BLD AUTO: 0.99 10*3/MM3 (ref 0.7–3.1)
LYMPHOCYTES NFR BLD AUTO: 17.7 % (ref 19.6–45.3)
MCH RBC QN AUTO: 29.9 PG (ref 26.6–33)
MCHC RBC AUTO-ENTMCNC: 32.7 G/DL (ref 31.5–35.7)
MCV RBC AUTO: 91.5 FL (ref 79–97)
MONOCYTES # BLD AUTO: 0.62 10*3/MM3 (ref 0.1–0.9)
MONOCYTES NFR BLD AUTO: 11.1 % (ref 5–12)
NEUTROPHILS # BLD AUTO: 3.84 10*3/MM3 (ref 1.7–7)
NEUTROPHILS NFR BLD AUTO: 68.7 % (ref 42.7–76)
NRBC BLD AUTO-RTO: 0 /100 WBC (ref 0–0.2)
PLATELET # BLD AUTO: 120 10*3/MM3 (ref 140–450)
PMV BLD AUTO: 9.9 FL (ref 6–12)
POTASSIUM BLD-SCNC: 3.5 MMOL/L (ref 3.5–5.2)
RBC # BLD AUTO: 3.54 10*6/MM3 (ref 4.14–5.8)
SODIUM BLD-SCNC: 141 MMOL/L (ref 136–145)
WBC NRBC COR # BLD: 5.59 10*3/MM3 (ref 3.4–10.8)

## 2019-05-26 PROCEDURE — 80048 BASIC METABOLIC PNL TOTAL CA: CPT | Performed by: HOSPITALIST

## 2019-05-26 PROCEDURE — A9270 NON-COVERED ITEM OR SERVICE: HCPCS | Performed by: HOSPITALIST

## 2019-05-26 PROCEDURE — 63710000001 ARIPIPRAZOLE 5 MG TABLET: Performed by: HOSPITALIST

## 2019-05-26 PROCEDURE — 63710000001 ASPIRIN 81 MG CHEWABLE TABLET: Performed by: HOSPITALIST

## 2019-05-26 PROCEDURE — 97110 THERAPEUTIC EXERCISES: CPT

## 2019-05-26 PROCEDURE — 63710000001 HYDROXYZINE 10 MG TABLET: Performed by: HOSPITALIST

## 2019-05-26 PROCEDURE — 85025 COMPLETE CBC W/AUTO DIFF WBC: CPT | Performed by: HOSPITALIST

## 2019-05-26 PROCEDURE — G0378 HOSPITAL OBSERVATION PER HR: HCPCS

## 2019-05-26 PROCEDURE — 99225 PR SBSQ OBSERVATION CARE/DAY 25 MINUTES: CPT | Performed by: INTERNAL MEDICINE

## 2019-05-26 PROCEDURE — 63710000001 PANTOPRAZOLE 40 MG TABLET DELAYED-RELEASE: Performed by: HOSPITALIST

## 2019-05-26 PROCEDURE — 63710000001 METOPROLOL SUCCINATE XL 25 MG TABLET SUSTAINED-RELEASE 24 HOUR: Performed by: HOSPITALIST

## 2019-05-26 PROCEDURE — 63710000001 LEVOTHYROXINE 75 MCG TABLET: Performed by: HOSPITALIST

## 2019-05-26 PROCEDURE — 63710000001 ATORVASTATIN 10 MG TABLET: Performed by: HOSPITALIST

## 2019-05-26 PROCEDURE — 97163 PT EVAL HIGH COMPLEX 45 MIN: CPT

## 2019-05-26 RX ORDER — ASPIRIN 81 MG/1
81 TABLET, CHEWABLE ORAL DAILY
Status: DISCONTINUED | OUTPATIENT
Start: 2019-05-26 | End: 2019-05-28 | Stop reason: HOSPADM

## 2019-05-26 RX ORDER — POTASSIUM CHLORIDE 750 MG/1
40 CAPSULE, EXTENDED RELEASE ORAL AS NEEDED
Status: DISCONTINUED | OUTPATIENT
Start: 2019-05-26 | End: 2019-05-28

## 2019-05-26 RX ORDER — POTASSIUM CHLORIDE 1.5 G/1.77G
40 POWDER, FOR SOLUTION ORAL AS NEEDED
Status: DISCONTINUED | OUTPATIENT
Start: 2019-05-26 | End: 2019-05-28

## 2019-05-26 RX ADMIN — ASPIRIN 81 MG: 81 TABLET, CHEWABLE ORAL at 15:10

## 2019-05-26 RX ADMIN — ARIPIPRAZOLE 7.5 MG: 5 TABLET ORAL at 17:31

## 2019-05-26 RX ADMIN — PANTOPRAZOLE SODIUM 40 MG: 40 TABLET, DELAYED RELEASE ORAL at 17:31

## 2019-05-26 RX ADMIN — LEVOTHYROXINE SODIUM 75 MCG: 75 TABLET ORAL at 09:28

## 2019-05-26 RX ADMIN — METOPROLOL SUCCINATE 25 MG: 25 TABLET, FILM COATED, EXTENDED RELEASE ORAL at 09:29

## 2019-05-26 RX ADMIN — HYDROXYZINE HYDROCHLORIDE 10 MG: 10 TABLET ORAL at 09:28

## 2019-05-26 RX ADMIN — PANTOPRAZOLE SODIUM 40 MG: 40 TABLET, DELAYED RELEASE ORAL at 09:28

## 2019-05-26 RX ADMIN — HYDROXYZINE HYDROCHLORIDE 10 MG: 10 TABLET ORAL at 17:31

## 2019-05-26 RX ADMIN — ATORVASTATIN CALCIUM 10 MG: 10 TABLET, FILM COATED ORAL at 17:32

## 2019-05-27 LAB
ANION GAP SERPL CALCULATED.3IONS-SCNC: 10.3 MMOL/L
BASOPHILS # BLD AUTO: 0.05 10*3/MM3 (ref 0–0.2)
BASOPHILS NFR BLD AUTO: 0.8 % (ref 0–1.5)
BUN BLD-MCNC: 14 MG/DL (ref 8–23)
BUN/CREAT SERPL: 17.5 (ref 7–25)
CALCIUM SPEC-SCNC: 9 MG/DL (ref 8.6–10.5)
CHLORIDE SERPL-SCNC: 102 MMOL/L (ref 98–107)
CO2 SERPL-SCNC: 27.7 MMOL/L (ref 22–29)
CREAT BLD-MCNC: 0.8 MG/DL (ref 0.76–1.27)
DEPRECATED RDW RBC AUTO: 47.4 FL (ref 37–54)
EOSINOPHIL # BLD AUTO: 0.19 10*3/MM3 (ref 0–0.4)
EOSINOPHIL NFR BLD AUTO: 2.9 % (ref 0.3–6.2)
ERYTHROCYTE [DISTWIDTH] IN BLOOD BY AUTOMATED COUNT: 14.5 % (ref 12.3–15.4)
GFR SERPL CREATININE-BSD FRML MDRD: 93 ML/MIN/1.73
GLUCOSE BLD-MCNC: 130 MG/DL (ref 65–99)
HCT VFR BLD AUTO: 32.5 % (ref 37.5–51)
HGB BLD-MCNC: 10.8 G/DL (ref 13–17.7)
IMM GRANULOCYTES # BLD AUTO: 0.03 10*3/MM3 (ref 0–0.05)
IMM GRANULOCYTES NFR BLD AUTO: 0.5 % (ref 0–0.5)
LYMPHOCYTES # BLD AUTO: 1.57 10*3/MM3 (ref 0.7–3.1)
LYMPHOCYTES NFR BLD AUTO: 24.2 % (ref 19.6–45.3)
MCH RBC QN AUTO: 29.8 PG (ref 26.6–33)
MCHC RBC AUTO-ENTMCNC: 33.2 G/DL (ref 31.5–35.7)
MCV RBC AUTO: 89.5 FL (ref 79–97)
MONOCYTES # BLD AUTO: 0.62 10*3/MM3 (ref 0.1–0.9)
MONOCYTES NFR BLD AUTO: 9.5 % (ref 5–12)
NEUTROPHILS # BLD AUTO: 4.04 10*3/MM3 (ref 1.7–7)
NEUTROPHILS NFR BLD AUTO: 62.1 % (ref 42.7–76)
NRBC BLD AUTO-RTO: 0 /100 WBC (ref 0–0.2)
PLATELET # BLD AUTO: 134 10*3/MM3 (ref 140–450)
PMV BLD AUTO: 10.2 FL (ref 6–12)
POTASSIUM BLD-SCNC: 3.6 MMOL/L (ref 3.5–5.2)
POTASSIUM BLD-SCNC: 4.7 MMOL/L (ref 3.5–5.2)
RBC # BLD AUTO: 3.63 10*6/MM3 (ref 4.14–5.8)
SODIUM BLD-SCNC: 140 MMOL/L (ref 136–145)
WBC NRBC COR # BLD: 6.5 10*3/MM3 (ref 3.4–10.8)

## 2019-05-27 PROCEDURE — A9270 NON-COVERED ITEM OR SERVICE: HCPCS | Performed by: HOSPITALIST

## 2019-05-27 PROCEDURE — 63710000001 POLYETHYLENE GLYCOL PACK: Performed by: HOSPITALIST

## 2019-05-27 PROCEDURE — 63710000001 LEVOTHYROXINE 75 MCG TABLET: Performed by: HOSPITALIST

## 2019-05-27 PROCEDURE — 97110 THERAPEUTIC EXERCISES: CPT

## 2019-05-27 PROCEDURE — 63710000001 POTASSIUM CHLORIDE 10 MEQ CAPSULE CONTROLLED-RELEASE: Performed by: HOSPITALIST

## 2019-05-27 PROCEDURE — 80048 BASIC METABOLIC PNL TOTAL CA: CPT | Performed by: HOSPITALIST

## 2019-05-27 PROCEDURE — 63710000001 ATORVASTATIN 10 MG TABLET: Performed by: HOSPITALIST

## 2019-05-27 PROCEDURE — G0378 HOSPITAL OBSERVATION PER HR: HCPCS

## 2019-05-27 PROCEDURE — 85025 COMPLETE CBC W/AUTO DIFF WBC: CPT | Performed by: HOSPITALIST

## 2019-05-27 PROCEDURE — 63710000001 ASPIRIN 81 MG CHEWABLE TABLET: Performed by: HOSPITALIST

## 2019-05-27 PROCEDURE — 84132 ASSAY OF SERUM POTASSIUM: CPT | Performed by: HOSPITALIST

## 2019-05-27 PROCEDURE — 63710000001 METOPROLOL SUCCINATE XL 25 MG TABLET SUSTAINED-RELEASE 24 HOUR: Performed by: HOSPITALIST

## 2019-05-27 PROCEDURE — 63710000001 ARIPIPRAZOLE 5 MG TABLET: Performed by: HOSPITALIST

## 2019-05-27 PROCEDURE — 63710000001 PANTOPRAZOLE 40 MG TABLET DELAYED-RELEASE: Performed by: HOSPITALIST

## 2019-05-27 RX ADMIN — ASPIRIN 81 MG: 81 TABLET, CHEWABLE ORAL at 09:13

## 2019-05-27 RX ADMIN — POLYETHYLENE GLYCOL 3350 17 G: 17 POWDER, FOR SOLUTION ORAL at 14:47

## 2019-05-27 RX ADMIN — ARIPIPRAZOLE 7.5 MG: 5 TABLET ORAL at 18:47

## 2019-05-27 RX ADMIN — POTASSIUM CHLORIDE 40 MEQ: 750 CAPSULE, EXTENDED RELEASE ORAL at 14:47

## 2019-05-27 RX ADMIN — METOPROLOL SUCCINATE 25 MG: 25 TABLET, FILM COATED, EXTENDED RELEASE ORAL at 09:13

## 2019-05-27 RX ADMIN — LEVOTHYROXINE SODIUM 75 MCG: 75 TABLET ORAL at 09:13

## 2019-05-27 RX ADMIN — ATORVASTATIN CALCIUM 10 MG: 10 TABLET, FILM COATED ORAL at 18:47

## 2019-05-27 RX ADMIN — POTASSIUM CHLORIDE 40 MEQ: 750 CAPSULE, EXTENDED RELEASE ORAL at 06:30

## 2019-05-27 RX ADMIN — PANTOPRAZOLE SODIUM 40 MG: 40 TABLET, DELAYED RELEASE ORAL at 18:47

## 2019-05-27 RX ADMIN — PANTOPRAZOLE SODIUM 40 MG: 40 TABLET, DELAYED RELEASE ORAL at 06:30

## 2019-05-28 VITALS
WEIGHT: 176 LBS | OXYGEN SATURATION: 94 % | HEART RATE: 69 BPM | HEIGHT: 71 IN | BODY MASS INDEX: 24.64 KG/M2 | RESPIRATION RATE: 18 BRPM | DIASTOLIC BLOOD PRESSURE: 60 MMHG | TEMPERATURE: 97.2 F | SYSTOLIC BLOOD PRESSURE: 135 MMHG

## 2019-05-28 LAB
CYTO UR: NORMAL
LAB AP CASE REPORT: NORMAL
PATH REPORT.FINAL DX SPEC: NORMAL
PATH REPORT.GROSS SPEC: NORMAL

## 2019-05-28 PROCEDURE — 63710000001 METOPROLOL SUCCINATE XL 25 MG TABLET SUSTAINED-RELEASE 24 HOUR: Performed by: HOSPITALIST

## 2019-05-28 PROCEDURE — A9270 NON-COVERED ITEM OR SERVICE: HCPCS | Performed by: HOSPITALIST

## 2019-05-28 PROCEDURE — 63710000001 LEVOTHYROXINE 75 MCG TABLET: Performed by: HOSPITALIST

## 2019-05-28 PROCEDURE — 63710000001 ASPIRIN 81 MG CHEWABLE TABLET: Performed by: HOSPITALIST

## 2019-05-28 PROCEDURE — G0378 HOSPITAL OBSERVATION PER HR: HCPCS

## 2019-05-28 PROCEDURE — 63710000001 PANTOPRAZOLE 40 MG TABLET DELAYED-RELEASE: Performed by: HOSPITALIST

## 2019-05-28 RX ORDER — PANTOPRAZOLE SODIUM 40 MG/1
40 TABLET, DELAYED RELEASE ORAL
Qty: 60 TABLET | Refills: 0 | Status: SHIPPED | OUTPATIENT
Start: 2019-05-28 | End: 2019-06-27

## 2019-05-28 RX ORDER — ATORVASTATIN CALCIUM 10 MG/1
10 TABLET, FILM COATED ORAL DAILY
Qty: 30 TABLET | Refills: 0 | Status: SHIPPED | OUTPATIENT
Start: 2019-05-28 | End: 2019-06-27

## 2019-05-28 RX ADMIN — ASPIRIN 81 MG: 81 TABLET, CHEWABLE ORAL at 09:14

## 2019-05-28 RX ADMIN — METOPROLOL SUCCINATE 25 MG: 25 TABLET, FILM COATED, EXTENDED RELEASE ORAL at 09:14

## 2019-05-28 RX ADMIN — LEVOTHYROXINE SODIUM 75 MCG: 75 TABLET ORAL at 09:14

## 2019-05-28 RX ADMIN — PANTOPRAZOLE SODIUM 40 MG: 40 TABLET, DELAYED RELEASE ORAL at 06:32

## 2019-05-29 ENCOUNTER — TELEPHONE (OUTPATIENT)
Dept: GASTROENTEROLOGY | Facility: CLINIC | Age: 84
End: 2019-05-29

## 2019-05-29 NOTE — TELEPHONE ENCOUNTER
----- Message from Sharla Mesa MD sent at 5/28/2019  4:51 PM EDT -----  The biopsies of the stomach body showed some mild inflammation.  He does not need any treatment for this.

## 2019-05-30 NOTE — TELEPHONE ENCOUNTER
Called pt and advised per Dr Mesa that the bx of the stomach body showed some mild inflammation and he does not need any treatment for this. Pt verb understanding.

## 2019-07-03 ENCOUNTER — HOSPITAL ENCOUNTER (EMERGENCY)
Facility: HOSPITAL | Age: 84
Discharge: HOME | End: 2019-07-03
Attending: EMERGENCY MEDICINE | Admitting: EMERGENCY MEDICINE

## 2019-07-03 ENCOUNTER — APPOINTMENT (OUTPATIENT)
Dept: GENERAL RADIOLOGY | Facility: HOSPITAL | Age: 84
End: 2019-07-03

## 2019-07-03 ENCOUNTER — APPOINTMENT (OUTPATIENT)
Dept: CT IMAGING | Facility: HOSPITAL | Age: 84
End: 2019-07-03

## 2019-07-03 VITALS
HEART RATE: 68 BPM | BODY MASS INDEX: 24.64 KG/M2 | SYSTOLIC BLOOD PRESSURE: 160 MMHG | TEMPERATURE: 97.9 F | OXYGEN SATURATION: 95 % | RESPIRATION RATE: 16 BRPM | WEIGHT: 176 LBS | DIASTOLIC BLOOD PRESSURE: 81 MMHG | HEIGHT: 71 IN

## 2019-07-03 DIAGNOSIS — R53.1 GENERALIZED WEAKNESS: Primary | ICD-10-CM

## 2019-07-03 DIAGNOSIS — F03.90 DEMENTIA WITHOUT BEHAVIORAL DISTURBANCE, UNSPECIFIED DEMENTIA TYPE: ICD-10-CM

## 2019-07-03 DIAGNOSIS — G20 PARKINSON'S DISEASE (HCC): ICD-10-CM

## 2019-07-03 LAB
ALBUMIN SERPL-MCNC: 3.7 G/DL (ref 3.5–5.2)
ALBUMIN/GLOB SERPL: 1.1 G/DL
ALP SERPL-CCNC: 80 U/L (ref 39–117)
ALT SERPL W P-5'-P-CCNC: 13 U/L (ref 1–41)
ANION GAP SERPL CALCULATED.3IONS-SCNC: 11.3 MMOL/L (ref 5–15)
AST SERPL-CCNC: 25 U/L (ref 1–40)
BACTERIA UR QL AUTO: ABNORMAL /HPF
BASOPHILS # BLD AUTO: 0.05 10*3/MM3 (ref 0–0.2)
BASOPHILS NFR BLD AUTO: 0.6 % (ref 0–1.5)
BILIRUB SERPL-MCNC: 1.1 MG/DL (ref 0.2–1.2)
BILIRUB UR QL STRIP: NEGATIVE
BUN BLD-MCNC: 14 MG/DL (ref 8–23)
BUN/CREAT SERPL: 14.6 (ref 7–25)
CALCIUM SPEC-SCNC: 9.9 MG/DL (ref 8.6–10.5)
CHLORIDE SERPL-SCNC: 101 MMOL/L (ref 98–107)
CLARITY UR: CLEAR
CO2 SERPL-SCNC: 27.7 MMOL/L (ref 22–29)
COLOR UR: YELLOW
CREAT BLD-MCNC: 0.96 MG/DL (ref 0.76–1.27)
DEPRECATED RDW RBC AUTO: 43.3 FL (ref 37–54)
EOSINOPHIL # BLD AUTO: 0.03 10*3/MM3 (ref 0–0.4)
EOSINOPHIL NFR BLD AUTO: 0.3 % (ref 0.3–6.2)
ERYTHROCYTE [DISTWIDTH] IN BLOOD BY AUTOMATED COUNT: 13.6 % (ref 12.3–15.4)
GFR SERPL CREATININE-BSD FRML MDRD: 75 ML/MIN/1.73
GLOBULIN UR ELPH-MCNC: 3.3 GM/DL
GLUCOSE BLD-MCNC: 124 MG/DL (ref 65–99)
GLUCOSE UR STRIP-MCNC: NEGATIVE MG/DL
HCT VFR BLD AUTO: 36.7 % (ref 37.5–51)
HGB BLD-MCNC: 11.3 G/DL (ref 13–17.7)
HGB UR QL STRIP.AUTO: ABNORMAL
HYALINE CASTS UR QL AUTO: ABNORMAL /LPF
IMM GRANULOCYTES # BLD AUTO: 0.02 10*3/MM3 (ref 0–0.05)
IMM GRANULOCYTES NFR BLD AUTO: 0.2 % (ref 0–0.5)
KETONES UR QL STRIP: NEGATIVE
LEUKOCYTE ESTERASE UR QL STRIP.AUTO: NEGATIVE
LYMPHOCYTES # BLD AUTO: 1.14 10*3/MM3 (ref 0.7–3.1)
LYMPHOCYTES NFR BLD AUTO: 13.1 % (ref 19.6–45.3)
MCH RBC QN AUTO: 26.9 PG (ref 26.6–33)
MCHC RBC AUTO-ENTMCNC: 30.8 G/DL (ref 31.5–35.7)
MCV RBC AUTO: 87.4 FL (ref 79–97)
MONOCYTES # BLD AUTO: 1.03 10*3/MM3 (ref 0.1–0.9)
MONOCYTES NFR BLD AUTO: 11.9 % (ref 5–12)
NEUTROPHILS # BLD AUTO: 6.41 10*3/MM3 (ref 1.7–7)
NEUTROPHILS NFR BLD AUTO: 73.9 % (ref 42.7–76)
NITRITE UR QL STRIP: NEGATIVE
NRBC BLD AUTO-RTO: 0 /100 WBC (ref 0–0.2)
PH UR STRIP.AUTO: 7 [PH] (ref 5–8)
PLATELET # BLD AUTO: 156 10*3/MM3 (ref 140–450)
PMV BLD AUTO: 10.3 FL (ref 6–12)
POTASSIUM BLD-SCNC: 4.1 MMOL/L (ref 3.5–5.2)
PROT SERPL-MCNC: 7 G/DL (ref 6–8.5)
PROT UR QL STRIP: NEGATIVE
RBC # BLD AUTO: 4.2 10*6/MM3 (ref 4.14–5.8)
RBC # UR: ABNORMAL /HPF
REF LAB TEST METHOD: ABNORMAL
SODIUM BLD-SCNC: 140 MMOL/L (ref 136–145)
SP GR UR STRIP: 1.02 (ref 1–1.03)
SQUAMOUS #/AREA URNS HPF: ABNORMAL /HPF
TROPONIN T SERPL-MCNC: <0.01 NG/ML (ref 0–0.03)
UROBILINOGEN UR QL STRIP: ABNORMAL
WBC NRBC COR # BLD: 8.68 10*3/MM3 (ref 3.4–10.8)
WBC UR QL AUTO: ABNORMAL /HPF

## 2019-07-03 PROCEDURE — 84484 ASSAY OF TROPONIN QUANT: CPT | Performed by: NURSE PRACTITIONER

## 2019-07-03 PROCEDURE — 85025 COMPLETE CBC W/AUTO DIFF WBC: CPT | Performed by: NURSE PRACTITIONER

## 2019-07-03 PROCEDURE — 93010 ELECTROCARDIOGRAM REPORT: CPT | Performed by: INTERNAL MEDICINE

## 2019-07-03 PROCEDURE — 81001 URINALYSIS AUTO W/SCOPE: CPT | Performed by: NURSE PRACTITIONER

## 2019-07-03 PROCEDURE — 70450 CT HEAD/BRAIN W/O DYE: CPT

## 2019-07-03 PROCEDURE — 80053 COMPREHEN METABOLIC PANEL: CPT | Performed by: NURSE PRACTITIONER

## 2019-07-03 PROCEDURE — 71046 X-RAY EXAM CHEST 2 VIEWS: CPT

## 2019-07-03 PROCEDURE — 93005 ELECTROCARDIOGRAM TRACING: CPT | Performed by: NURSE PRACTITIONER

## 2019-07-03 PROCEDURE — 99284 EMERGENCY DEPT VISIT MOD MDM: CPT

## 2019-07-03 RX ORDER — CETIRIZINE HYDROCHLORIDE 10 MG/1
10 TABLET ORAL DAILY
COMMUNITY

## 2019-07-03 RX ORDER — SIMVASTATIN 40 MG
40 TABLET ORAL NIGHTLY
COMMUNITY

## 2019-07-03 RX ORDER — PANTOPRAZOLE SODIUM 40 MG/1
40 TABLET, DELAYED RELEASE ORAL DAILY
COMMUNITY

## 2019-07-03 RX ORDER — SODIUM CHLORIDE 0.9 % (FLUSH) 0.9 %
10 SYRINGE (ML) INJECTION AS NEEDED
Status: DISCONTINUED | OUTPATIENT
Start: 2019-07-03 | End: 2019-07-03 | Stop reason: HOSPADM

## 2019-07-03 RX ADMIN — SODIUM CHLORIDE 1000 ML: 9 INJECTION, SOLUTION INTRAVENOUS at 11:33

## 2019-07-03 NOTE — DISCHARGE INSTRUCTIONS
Continue current home medications  Follow up with PMD and Neurologist as needed  Return to er for fever, chills, vomiting, chest pain, shortness of air, or any new or worsening symptoms

## 2019-07-03 NOTE — ED NOTES
Called [facility], no available trucks until late in the evening tomorrow.     Lauren Varela, RN  07/03/19 1535

## 2019-07-03 NOTE — PROGRESS NOTES
Received referral from RN (Lauren, RN) concerning issues with transport for pt. Entered room and introduced self/explained role to pt. Verified information on facesheet is correct.    Discussed options for transport and timing. Pt and family choose not to privately pay for stretcher transport, due to financial concerns. [facility] is booked until 11:00 pm tonight, and family fear waiting that long r/t issues with pt's medication, etc.     Pt's family inquired if ER staff could assist pt into vehicle, if there was anyone who could assist once they got pt back to home ((just for transfer assistance to his automated wheelchair). Pt and family advised that in the past, some fire departments have been willing to assist, and offered to call and inquire for them.    Called [facility] and spoke to dispatcher and explained the situation. Dispatcher confirmed that they would be willing to assist, and that family should just call [facility] phone number or 9-1-1 when they arrived home and specify that there is no emergency, but they needed lift help at home. This would generate run information for them, allowing them to provide the family with the lift assistance that they required. Thanked dispatch for assist and explanation.    Explained to family about fire department's willingness to assist, and provided [facility] phone number. Wife and daughter verbalized understanding and comfort with the d/c situation, and thanked ER CCP for assistance.    Updated ANITA Aguilar on new d/c transportation plan. Lauren verbalized understanding.

## 2019-07-03 NOTE — ED TRIAGE NOTES
Patient to ED via EMS from home for generalized weakness, failure to thrive and increased confusion x1 week. Patient with hx dementia

## 2019-07-03 NOTE — ED NOTES
[facility] estimated time of arrival to transport patient home is 2300.     Lauren Varela, RN  07/03/19 1534

## 2019-07-03 NOTE — ED PROVIDER NOTES
Pt with a h/o dementia and Parkinson's disease presents to the ED c/o generalized weakness that worsened yesterday. Per wife, pt has been declining for the past week. She states pt has had decreased appetite and responsiveness. He has difficultly swallowing and coughs after drinking liquids. Wife denies fever, vomiting, and diarrhea. Pt has not started new medications.   Neurology Dr. Means    On exam,   Pt in NAD  Heart-RRR  Lungs-CTAB  Neuro-Oriented to wife, unsure of location, year, or president. Masked facies, cogwheel rigidity, follows 1-2 step commands, RIVERA    EKG          EKG time: 11:49 AM  Rhythm/Rate: Afib, 72  P waves and AK: Afib  QRS, axis: RBBB   ST and T waves: normal     Interpreted Contemporaneously by me, independently viewed  changed compared to prior 5/23/19, was ventricular paced then     Progress and Consults-  12:2 PM  CT head ordered for evaluation.       Attestation:  The [facility] and I have discussed this patient's history, physical exam, and treatment plan.  I have reviewed the documentation and personally had a face to face interaction with the patient. I affirm the documentation and agree with the treatment and plan.  The attached note describes my personal findings.      Documentation assistance provided by yudi Cedeno for Dr. Rhodes. Information recorded by the yudi was done at my direction and has been verified and validated by me.     Kamala Cedeno  07/03/19 2429       Wicho Rhodes II, MD  07/03/19 7987

## 2019-07-03 NOTE — ED NOTES
Patient has history of Parkinsons disease and dementia. Patient is sitting quietly in bed, with eyes open, alert and oriented to place and person, and has been answering questions if his wife asks him, who is at bedside. The wife tells me that over the course of the past week, his appetite has decreased, he is having difficulty swallowing, and has become weak and less active. The patient normally gets around with a walker and, if needed, a power chair of some sort. This week, he has primarily used the chair to get around at home. The patient has a caregiver come to the home for 4 hours each day. During his bath today, the caregiver and wife agreed that his weakness was more concerning because he was unable to move his legs, and was not very alert.     Lauren Varela RN  07/03/19 9974

## 2019-07-03 NOTE — ED PROVIDER NOTES
"  EMERGENCY DEPARTMENT ENCOUNTER    CHIEF COMPLAINT  Chief Complaint: Generalized Weakness  History given by:Family  History limited by: Severe dementia  Time Seen: 1123  Room Number: 40/40  PMD: Adalberto Hudson MD      HPI:  Pt is a 83 y.o. male, h/o severe dementia and PD, who presents with generalized weakness that began yesterday, worsening today. Per family, pt has also had increased confusion this morning and with an episode of \"decreased responsiveness\" that has currently improved. Family states the pt woke up but was unaware of the fact that it was the morning. Per family, pt had also had decreased appetite for a week. Per family, the pt has had no fever, chills, or cough. Pt was taken off Pradaxa two weeks ago due to UGI bleed.   Past Medical History of CHF.     Duration: one day  Timing:constant  Location:generalized  Radiation:none  Quality:weakness  Intensity/Severity:moderate  Progression:unchanged  Associated Symptoms:confusion, decreased appetite  Aggravating Factors:none  Alleviating Factors:none  Previous Episodes: none  Treatment before arrival:Pt was taken off his Pradaxa two weeks ago due to GI bleed.    PAST MEDICAL HISTORY  Active Ambulatory Problems     Diagnosis Date Noted   • Cellulitis and abscess of leg, except foot 08/02/2018   • Cellulitis 12/06/2018   • Acute upper GI bleed 05/23/2019     Resolved Ambulatory Problems     Diagnosis Date Noted   • No Resolved Ambulatory Problems     Past Medical History:   Diagnosis Date   • CHF (congestive heart failure) (CMS/HCC)    • Dementia    • Disease of thyroid gland    • Elevated cholesterol    • GERD (gastroesophageal reflux disease)    • Hypertension    • Lewy body Parkinson disease    • Parkinson disease (CMS/HCC)        PAST SURGICAL HISTORY  Past Surgical History:   Procedure Laterality Date   • CATARACT EXTRACTION     • CORONARY ARTERY BYPASS GRAFT     • ENDOSCOPY N/A 5/25/2019    Procedure: ESOPHAGOGASTRODUODENOSCOPY WITH " BIOPSIES;  Surgeon: Sharla Mesa MD;  Location: [facility] ENDOSCOPY;  Service: Gastroenterology   • HERNIA REPAIR     • HERNIA REPAIR     • KNEE ARTHROPLASTY     • PACEMAKER IMPLANTATION     • RADICAL ORCHIECTOMY         FAMILY HISTORY  History reviewed. No pertinent family history.    SOCIAL HISTORY  Social History     Socioeconomic History   • Marital status:      Spouse name: Not on file   • Number of children: Not on file   • Years of education: Not on file   • Highest education level: Not on file   Tobacco Use   • Smoking status: Never Smoker   • Smokeless tobacco: Never Used   Substance and Sexual Activity   • Alcohol use: No   • Drug use: No   • Sexual activity: Defer         ALLERGIES  Patient has no known allergies.    REVIEW OF SYSTEMS  Review of Systems   Unable to perform ROS: Dementia       PHYSICAL EXAM  ED Triage Vitals [07/03/19 1032]   Temp Heart Rate Resp BP SpO2   97.9 °F (36.6 °C) 69 16 142/72 95 %       Physical Exam   Constitutional: He is well-developed, well-nourished, and in no distress. No distress.   HENT:   Head: Atraumatic.   Mouth/Throat: Mucous membranes are normal.   Eyes: No scleral icterus.   Neck: Normal range of motion.   Cardiovascular: Normal rate, regular rhythm and normal heart sounds.   Pulmonary/Chest: Effort normal and breath sounds normal.   Abdominal: Soft. Bowel sounds are normal. He exhibits no distension.   Musculoskeletal: Normal range of motion. He exhibits no edema (BLE).   Neurological: He is alert.   Oriented to person and place. Follows simple commands.    Skin: Skin is warm and dry.   Psychiatric: Mood and affect normal.   Nursing note and vitals reviewed.      LAB RESULTS  Recent Results (from the past 24 hour(s))   Comprehensive Metabolic Panel    Collection Time: 07/03/19 11:32 AM   Result Value Ref Range    Glucose 124 (H) 65 - 99 mg/dL    BUN 14 8 - 23 mg/dL    Creatinine 0.96 0.76 - 1.27 mg/dL    Sodium 140 136 - 145 mmol/L    Potassium 4.1 3.5 -  5.2 mmol/L    Chloride 101 98 - 107 mmol/L    CO2 27.7 22.0 - 29.0 mmol/L    Calcium 9.9 8.6 - 10.5 mg/dL    Total Protein 7.0 6.0 - 8.5 g/dL    Albumin 3.70 3.50 - 5.20 g/dL    ALT (SGPT) 13 1 - 41 U/L    AST (SGOT) 25 1 - 40 U/L    Alkaline Phosphatase 80 39 - 117 U/L    Total Bilirubin 1.1 0.2 - 1.2 mg/dL    eGFR Non African Amer 75 >60 mL/min/1.73    Globulin 3.3 gm/dL    A/G Ratio 1.1 g/dL    BUN/Creatinine Ratio 14.6 7.0 - 25.0    Anion Gap 11.3 5.0 - 15.0 mmol/L   Troponin    Collection Time: 07/03/19 11:32 AM   Result Value Ref Range    Troponin T <0.010 0.000 - 0.030 ng/mL   CBC Auto Differential    Collection Time: 07/03/19 11:32 AM   Result Value Ref Range    WBC 8.68 3.40 - 10.80 10*3/mm3    RBC 4.20 4.14 - 5.80 10*6/mm3    Hemoglobin 11.3 (L) 13.0 - 17.7 g/dL    Hematocrit 36.7 (L) 37.5 - 51.0 %    MCV 87.4 79.0 - 97.0 fL    MCH 26.9 26.6 - 33.0 pg    MCHC 30.8 (L) 31.5 - 35.7 g/dL    RDW 13.6 12.3 - 15.4 %    RDW-SD 43.3 37.0 - 54.0 fl    MPV 10.3 6.0 - 12.0 fL    Platelets 156 140 - 450 10*3/mm3    Neutrophil % 73.9 42.7 - 76.0 %    Lymphocyte % 13.1 (L) 19.6 - 45.3 %    Monocyte % 11.9 5.0 - 12.0 %    Eosinophil % 0.3 0.3 - 6.2 %    Basophil % 0.6 0.0 - 1.5 %    Immature Grans % 0.2 0.0 - 0.5 %    Neutrophils, Absolute 6.41 1.70 - 7.00 10*3/mm3    Lymphocytes, Absolute 1.14 0.70 - 3.10 10*3/mm3    Monocytes, Absolute 1.03 (H) 0.10 - 0.90 10*3/mm3    Eosinophils, Absolute 0.03 0.00 - 0.40 10*3/mm3    Basophils, Absolute 0.05 0.00 - 0.20 10*3/mm3    Immature Grans, Absolute 0.02 0.00 - 0.05 10*3/mm3    nRBC 0.0 0.0 - 0.2 /100 WBC   Urinalysis With Microscopic If Indicated (No Culture) - Urine, Catheter    Collection Time: 07/03/19 12:12 PM   Result Value Ref Range    Color, UA Yellow Yellow, Straw    Appearance, UA Clear Clear    pH, UA 7.0 5.0 - 8.0    Specific Gravity, UA 1.022 1.005 - 1.030    Glucose, UA Negative Negative    Ketones, UA Negative Negative    Bilirubin, UA Negative Negative    Blood,  UA Small (1+) (A) Negative    Protein, UA Negative Negative    Leuk Esterase, UA Negative Negative    Nitrite, UA Negative Negative    Urobilinogen, UA 2.0 E.U./dL (A) 0.2 - 1.0 E.U./dL   Urinalysis, Microscopic Only - Urine, Catheter    Collection Time: 07/03/19 12:12 PM   Result Value Ref Range    RBC, UA 6-12 (A) None Seen, 0-2 /HPF    WBC, UA 0-2 None Seen, 0-2 /HPF    Bacteria, UA None Seen None Seen /HPF    Squamous Epithelial Cells, UA 0-2 None Seen, 0-2 /HPF    Hyaline Casts, UA 0-2 None Seen /LPF    Methodology Automated Microscopy        I ordered the above labs and reviewed the results    RADIOLOGY  CT Head Without Contrast   Preliminary Result   1. No acute abnormality is seen.   2. There is mild small vessel disease in cerebral white matter,   calcified atherosclerotic plaques in the intracranial segments of the   distal vertebral arteries and extensive calcified plaques in cavernous   segments of internal carotid arteries bilaterally. There is mild diffuse   cerebral atrophy. The remainder of the head CT is normal.       Radiation dose reduction techniques were utilized, including automated   exposure control and exposure modulation based on body size.              XR Chest 2 View   Final Result   Bibasilar atelectasis/infiltrate and pleural effusions,   continued follow-up suggested.       This report was finalized on 7/3/2019 12:14 PM by Dr. Kenneth Diaz M.D.              I ordered the above noted radiological studies and reviewed the images on the PACS system.        PROGRESS AND CONSULTS    1230 Reviewed pt's history and workup with Dr. Rhodes.  At bedside evaluation, they agree with the plan of care.    1451  Discussed the pt's case with Dr Means (Neurology), who reviewed the pt's results, and states pt can f/u as needed.     1455  Rechecked pt. Pt is resting comfortably. Family at bedside. Notified pt and family of test results, including negative acute CT Head, and discussion with   Miguelangel. Discussed the plan to discharge the pt home. I instructed the pt to f/u with Dr. Means as needed. Pt understands and agrees with the plan, all questions answered.    Reviewed implications of results, diagnosis, meds, responsibility to follow up, warning signs and symptoms of possible worsening, potential complications and reasons to return to ER with patient.  Discussed all results and noted any abnormalities with patient.  Discussed absolute need to recheck abnormalities with Neurology.    Discussed plan for discharge, as there is no emergent indication for admission.  Pt is agreeable and understands need for follow up and repeat testing.  Pt is aware that discharge does not mean that nothing is wrong but it indicates no emergency is present.  Pt is discharged with instructions to follow up with primary care doctor to have their blood pressure rechecked.       DIAGNOSIS  Final diagnoses:   Generalized weakness   Parkinson's disease (CMS/HCC)   Dementia without behavioral disturbance, unspecified dementia type       FOLLOW UP   Adalberto Hudson MD  [address]  [facility] [address] [phone]    Schedule an appointment as soon as possible for a visit   As needed    Marcos Means MD  [address]  [place] [id number] [phone]    Schedule an appointment as soon as possible for a visit   As needed      COURSE & MEDICAL DECISION MAKING  Pertinent Labs and Imaging studies that were ordered and reviewed are noted above.  Results were reviewed/discussed with the patient and they were also made aware of online assess.   Pt also made aware that some labs, such as cultures, will not be resulted during ER visit and follow up with PMD is necessary.     MEDICATIONS GIVEN IN ER  Medications   sodium chloride 0.9 % flush 10 mL (not administered)   sodium chloride 0.9 % bolus 1,000 mL (1,000 mL Intravenous New Bag 7/3/19 1133)       /81   Pulse 68   Temp 97.9 °F  "(36.6 °C) (Tympanic)   Resp 16   Ht 180.3 cm (71\")   Wt 79.8 kg (176 lb)   SpO2 95%   BMI 24.55 kg/m²       I personally reviewed the past medical history, past surgical history, social history, family history, current medications and allergies as they appear in this chart.  The scribe's note accurately reflects the work and decisions made by me.     Documentation assistance provided by yudi Ortega for FAUSTINA Phipps on 7/3/2019 at 3:10 PM. Information recorded by the scribe was done at my direction and has been verified and validated by me.          Akshat Ortega  07/03/19 2711       Neha Wei APRN  07/03/19 5393    "

## 2024-06-14 NOTE — OUTREACH NOTE
Medical Week 1 Survey      Responses   Facility patient discharged from?  [facility]   Does the patient have one of the following disease processes/diagnoses(primary or secondary)?  Other   Is there a successful TCM telephone encounter documented?  No   Week 1 attempt successful?  Yes   Call start time  1040   Call end time  1046   Discharge diagnosis  Cellulitis of the right lower extremity with superficial ulcers   Meds reviewed with patient/caregiver?  Yes   Is the patient having any side effects they believe may be caused by any medication additions or changes?  No   Does the patient have all medications ordered at discharge?  Yes   Is the patient taking all medications as directed (includes completed medication regime)?  Yes   Does the patient have a primary care provider?   Yes   Does the patient have an appointment with their PCP within 7 days of discharge?  Yes   Has the patient kept scheduled appointments due by today?  N/A   Comments  pt has appt with Dr Cook on 8/10/18 for pacemaker check   Comments  pt leaves ulcer open to air, no dressing   Did the patient receive a copy of their discharge instructions?  Yes   Nursing interventions  Reviewed instructions with patient   What is the patient's perception of their health status since discharge?  Improving   Is the patient/caregiver able to teach back signs and symptoms related to disease process for when to call PCP?  Yes   Is the patient/caregiver able to teach back signs and symptoms related to disease process for when to call 911?  Yes   Is the patient/caregiver able to teach back the hierarchy of who to call/visit for symptoms/problems? PCP, Specialist, Home health nurse, Urgent Care, ED, 911  Yes   Week 1 call completed?  Yes          Elo Briseno RN   Never

## (undated) DEVICE — FRCP BX RADJAW4 NDL 2.8 240CM LG OG BX40

## (undated) DEVICE — Device: Brand: DEFENDO AIR/WATER/SUCTION AND BIOPSY VALVE

## (undated) DEVICE — BITEBLOCK OMNI BLOC

## (undated) DEVICE — CANN NASL CO2 TRULINK W/O2 A/

## (undated) DEVICE — TUBING, SUCTION, 1/4" X 10', STRAIGHT: Brand: MEDLINE